# Patient Record
Sex: FEMALE | URBAN - METROPOLITAN AREA
[De-identification: names, ages, dates, MRNs, and addresses within clinical notes are randomized per-mention and may not be internally consistent; named-entity substitution may affect disease eponyms.]

---

## 2022-03-18 PROBLEM — I24.9 ACS (ACUTE CORONARY SYNDROME) (HCC): Status: ACTIVE | Noted: 2019-08-23

## 2022-03-18 PROBLEM — I21.9 MYOCARDIAL INFARCTION (HCC): Status: ACTIVE | Noted: 2019-08-23

## 2022-03-20 PROBLEM — G40.909 SEIZURE DISORDER (HCC): Status: ACTIVE | Noted: 2018-02-24

## 2022-03-20 PROBLEM — E66.01 SEVERE OBESITY (HCC): Status: ACTIVE | Noted: 2019-08-23

## 2023-09-13 ENCOUNTER — HOSPITAL ENCOUNTER (EMERGENCY)
Age: 53
Discharge: HOME | End: 2023-09-13
Attending: STUDENT IN AN ORGANIZED HEALTH CARE EDUCATION/TRAINING PROGRAM
Payer: MEDICARE

## 2023-09-13 VITALS
SYSTOLIC BLOOD PRESSURE: 148 MMHG | HEIGHT: 72 IN | DIASTOLIC BLOOD PRESSURE: 94 MMHG | RESPIRATION RATE: 17 BRPM | HEART RATE: 82 BPM | TEMPERATURE: 98 F | BODY MASS INDEX: 33.46 KG/M2 | WEIGHT: 247 LBS | OXYGEN SATURATION: 99 %

## 2023-09-13 DIAGNOSIS — H81.10 BENIGN PAROXYSMAL POSITIONAL VERTIGO, UNSPECIFIED LATERALITY: Primary | ICD-10-CM

## 2023-09-13 LAB
ALBUMIN SERPL-MCNC: 3.4 G/DL (ref 3.5–5)
ALBUMIN/GLOB SERPL: 0.9 (ref 0.4–1.6)
ALP SERPL-CCNC: 64 U/L (ref 50–136)
ALT SERPL-CCNC: 19 U/L (ref 12–65)
ANION GAP SERPL CALC-SCNC: 6 MMOL/L (ref 2–11)
AST SERPL-CCNC: 15 U/L (ref 15–37)
BASOPHILS # BLD: 0 K/UL (ref 0–0.2)
BASOPHILS NFR BLD: 0 % (ref 0–2)
BILIRUB SERPL-MCNC: 0.4 MG/DL (ref 0.2–1.1)
BUN SERPL-MCNC: 10 MG/DL (ref 6–23)
CALCIUM SERPL-MCNC: 9 MG/DL (ref 8.3–10.4)
CHLORIDE SERPL-SCNC: 106 MMOL/L (ref 101–110)
CO2 SERPL-SCNC: 27 MMOL/L (ref 21–32)
CREAT SERPL-MCNC: 0.8 MG/DL (ref 0.6–1)
DIFFERENTIAL METHOD BLD: ABNORMAL
EOSINOPHIL # BLD: 0 K/UL (ref 0–0.8)
EOSINOPHIL NFR BLD: 0 % (ref 0.5–7.8)
ERYTHROCYTE [DISTWIDTH] IN BLOOD BY AUTOMATED COUNT: 13.2 % (ref 11.9–14.6)
GLOBULIN SER CALC-MCNC: 4 G/DL (ref 2.8–4.5)
GLUCOSE SERPL-MCNC: 96 MG/DL (ref 65–100)
HCT VFR BLD AUTO: 36 % (ref 35.8–46.3)
HGB BLD-MCNC: 11.9 G/DL (ref 11.7–15.4)
IMM GRANULOCYTES # BLD AUTO: 0 K/UL (ref 0–0.5)
IMM GRANULOCYTES NFR BLD AUTO: 0 % (ref 0–5)
LYMPHOCYTES # BLD: 1.2 K/UL (ref 0.5–4.6)
LYMPHOCYTES NFR BLD: 16 % (ref 13–44)
MCH RBC QN AUTO: 29.9 PG (ref 26.1–32.9)
MCHC RBC AUTO-ENTMCNC: 33.1 G/DL (ref 31.4–35)
MCV RBC AUTO: 90.5 FL (ref 82–102)
MONOCYTES # BLD: 0.5 K/UL (ref 0.1–1.3)
MONOCYTES NFR BLD: 7 % (ref 4–12)
NEUTS SEG # BLD: 5.7 K/UL (ref 1.7–8.2)
NEUTS SEG NFR BLD: 77 % (ref 43–78)
NRBC # BLD: 0 K/UL (ref 0–0.2)
PLATELET # BLD AUTO: 213 K/UL (ref 150–450)
PMV BLD AUTO: 11.3 FL (ref 9.4–12.3)
POTASSIUM SERPL-SCNC: 3.6 MMOL/L (ref 3.5–5.1)
PROT SERPL-MCNC: 7.4 G/DL (ref 6.3–8.2)
RBC # BLD AUTO: 3.98 M/UL (ref 4.05–5.2)
SODIUM SERPL-SCNC: 139 MMOL/L (ref 133–143)
WBC # BLD AUTO: 7.4 K/UL (ref 4.3–11.1)

## 2023-09-13 PROCEDURE — 80053 COMPREHEN METABOLIC PANEL: CPT

## 2023-09-13 PROCEDURE — 99284 EMERGENCY DEPT VISIT MOD MDM: CPT

## 2023-09-13 PROCEDURE — 85025 COMPLETE CBC W/AUTO DIFF WBC: CPT

## 2023-09-13 PROCEDURE — 6370000000 HC RX 637 (ALT 250 FOR IP): Performed by: STUDENT IN AN ORGANIZED HEALTH CARE EDUCATION/TRAINING PROGRAM

## 2023-09-13 RX ORDER — MECLIZINE HCL 25MG 25 MG/1
25 TABLET, CHEWABLE ORAL 3 TIMES DAILY PRN
Qty: 15 TABLET | Refills: 0 | Status: SHIPPED | OUTPATIENT
Start: 2023-09-13

## 2023-09-13 RX ORDER — MECLIZINE HYDROCHLORIDE 25 MG/1
25 TABLET ORAL
Status: COMPLETED | OUTPATIENT
Start: 2023-09-13 | End: 2023-09-13

## 2023-09-13 RX ADMIN — MECLIZINE HYDROCHLORIDE 25 MG: 25 TABLET ORAL at 19:47

## 2023-09-13 ASSESSMENT — LIFESTYLE VARIABLES: HOW OFTEN DO YOU HAVE A DRINK CONTAINING ALCOHOL: NEVER

## 2023-09-13 ASSESSMENT — PAIN SCALES - GENERAL: PAINLEVEL_OUTOF10: 4

## 2023-09-13 ASSESSMENT — PAIN - FUNCTIONAL ASSESSMENT: PAIN_FUNCTIONAL_ASSESSMENT: 0-10

## 2023-09-13 NOTE — ED TRIAGE NOTES
Pt c/o dizziness since 2pm today, states she feels like the room is spinning. Denies numbness, weakness, or tingling, also denies CP or SOB. Pt also reports a headache.

## 2023-09-14 NOTE — DISCHARGE INSTRUCTIONS
Take meclizine as needed for dizziness. Follow-up with your neurologist next week as previously scheduled. Return to the ER for any worsening or worrisome symptoms.

## 2023-09-14 NOTE — ED PROVIDER NOTES
Emergency Department Provider Note       PCP: Pcp No   Age: 50 y.o. Sex: female     DISPOSITION Decision To Discharge 09/13/2023 08:34:28 PM       ICD-10-CM    1. Benign paroxysmal positional vertigo, unspecified laterality  H81.10           Medical Decision Making     Complexity of Problems Addressed:  Complexity of Problem: 1 acute, uncomplicated illness or injury. Data Reviewed and Analyzed:  I independently ordered and reviewed each unique test.  I reviewed external records: provider visit note from outside specialist.   The patients assessment required an independent historian: mother. The reason they were needed is important historical information not provided by the patient. Discussion of management or test interpretation. 53-year-old female presents to the emergency department complaining of dizziness that began earlier today. States she was up and walking and moving her head when the symptoms began. Denies any headache. Denies numbness or weakness in extremities. Denies coordination abnormalities or difficulty walking. Does report similar symptoms in the past that resolved on their own. Is established with a neurologist.  Basic lab work was ordered which shows no emergent findings. Patient given meclizine. Does endorse improvement of symptoms after meclizine. On exam there is normal neurologic exam, patient is able to ambulate without difficulty. She is in no distress and otherwise well-appearing. Risk of Complications and/or Morbidity of Patient Management:  Prescription drug management performed    History      53-year-old female presents to the emergency department complaining of dizziness that began earlier today. States she was up and walking and moving her head when the symptoms began. Denies any headache. Denies numbness or weakness in extremities. Denies coordination abnormalities or difficulty walking.   Does report similar symptoms in the past that resolved on

## 2023-10-21 ENCOUNTER — HOSPITAL ENCOUNTER (EMERGENCY)
Age: 53
Discharge: HOME | End: 2023-10-21
Attending: EMERGENCY MEDICINE
Payer: MEDICARE

## 2023-10-21 VITALS
HEIGHT: 72 IN | WEIGHT: 252 LBS | RESPIRATION RATE: 20 BRPM | SYSTOLIC BLOOD PRESSURE: 125 MMHG | HEART RATE: 77 BPM | TEMPERATURE: 98.4 F | OXYGEN SATURATION: 98 % | BODY MASS INDEX: 34.13 KG/M2 | DIASTOLIC BLOOD PRESSURE: 86 MMHG

## 2023-10-21 DIAGNOSIS — T78.40XA ALLERGIC REACTION, INITIAL ENCOUNTER: Primary | ICD-10-CM

## 2023-10-21 LAB
ALBUMIN SERPL-MCNC: 3.8 G/DL (ref 3.5–5)
ALBUMIN/GLOB SERPL: 0.9 (ref 0.4–1.6)
ALP SERPL-CCNC: 73 U/L (ref 50–136)
ALT SERPL-CCNC: 17 U/L (ref 12–65)
ANION GAP SERPL CALC-SCNC: 6 MMOL/L (ref 2–11)
AST SERPL-CCNC: 17 U/L (ref 15–37)
BASOPHILS # BLD: 0 K/UL (ref 0–0.2)
BASOPHILS NFR BLD: 0 % (ref 0–2)
BILIRUB SERPL-MCNC: 0.3 MG/DL (ref 0.2–1.1)
BUN SERPL-MCNC: 11 MG/DL (ref 6–23)
CALCIUM SERPL-MCNC: 9.1 MG/DL (ref 8.3–10.4)
CHLORIDE SERPL-SCNC: 107 MMOL/L (ref 101–110)
CO2 SERPL-SCNC: 25 MMOL/L (ref 21–32)
CREAT SERPL-MCNC: 1.05 MG/DL (ref 0.6–1)
DIFFERENTIAL METHOD BLD: ABNORMAL
EOSINOPHIL # BLD: 0 K/UL (ref 0–0.8)
EOSINOPHIL NFR BLD: 0 % (ref 0.5–7.8)
ERYTHROCYTE [DISTWIDTH] IN BLOOD BY AUTOMATED COUNT: 13.3 % (ref 11.9–14.6)
GLOBULIN SER CALC-MCNC: 4.4 G/DL (ref 2.8–4.5)
GLUCOSE SERPL-MCNC: 107 MG/DL (ref 65–100)
HCT VFR BLD AUTO: 41.5 % (ref 35.8–46.3)
HGB BLD-MCNC: 13.7 G/DL (ref 11.7–15.4)
IMM GRANULOCYTES # BLD AUTO: 0.1 K/UL (ref 0–0.5)
IMM GRANULOCYTES NFR BLD AUTO: 0 % (ref 0–5)
LYMPHOCYTES # BLD: 2.6 K/UL (ref 0.5–4.6)
LYMPHOCYTES NFR BLD: 21 % (ref 13–44)
MCH RBC QN AUTO: 29.9 PG (ref 26.1–32.9)
MCHC RBC AUTO-ENTMCNC: 33 G/DL (ref 31.4–35)
MCV RBC AUTO: 90.6 FL (ref 82–102)
MONOCYTES # BLD: 0.9 K/UL (ref 0.1–1.3)
MONOCYTES NFR BLD: 7 % (ref 4–12)
NEUTS SEG # BLD: 9 K/UL (ref 1.7–8.2)
NEUTS SEG NFR BLD: 71 % (ref 43–78)
NRBC # BLD: 0 K/UL (ref 0–0.2)
PLATELET # BLD AUTO: 306 K/UL (ref 150–450)
PMV BLD AUTO: 11.2 FL (ref 9.4–12.3)
POTASSIUM SERPL-SCNC: 4 MMOL/L (ref 3.5–5.1)
PROT SERPL-MCNC: 8.2 G/DL (ref 6.3–8.2)
RBC # BLD AUTO: 4.58 M/UL (ref 4.05–5.2)
SODIUM SERPL-SCNC: 138 MMOL/L (ref 133–143)
WBC # BLD AUTO: 12.7 K/UL (ref 4.3–11.1)

## 2023-10-21 PROCEDURE — 85025 COMPLETE CBC W/AUTO DIFF WBC: CPT

## 2023-10-21 PROCEDURE — 2580000003 HC RX 258: Performed by: EMERGENCY MEDICINE

## 2023-10-21 PROCEDURE — 99284 EMERGENCY DEPT VISIT MOD MDM: CPT

## 2023-10-21 PROCEDURE — 80053 COMPREHEN METABOLIC PANEL: CPT

## 2023-10-21 PROCEDURE — A4216 STERILE WATER/SALINE, 10 ML: HCPCS | Performed by: EMERGENCY MEDICINE

## 2023-10-21 PROCEDURE — 2500000003 HC RX 250 WO HCPCS: Performed by: EMERGENCY MEDICINE

## 2023-10-21 PROCEDURE — 96374 THER/PROPH/DIAG INJ IV PUSH: CPT

## 2023-10-21 PROCEDURE — 6360000002 HC RX W HCPCS: Performed by: EMERGENCY MEDICINE

## 2023-10-21 PROCEDURE — 96375 TX/PRO/DX INJ NEW DRUG ADDON: CPT

## 2023-10-21 RX ORDER — PREDNISONE 20 MG/1
60 TABLET ORAL DAILY
Qty: 6 TABLET | Refills: 0 | Status: SHIPPED | OUTPATIENT
Start: 2023-10-21 | End: 2023-10-23

## 2023-10-21 RX ORDER — DIPHENHYDRAMINE HYDROCHLORIDE 50 MG/ML
50 INJECTION INTRAMUSCULAR; INTRAVENOUS
Status: COMPLETED | OUTPATIENT
Start: 2023-10-21 | End: 2023-10-21

## 2023-10-21 RX ORDER — DEXAMETHASONE SODIUM PHOSPHATE 10 MG/ML
10 INJECTION INTRAMUSCULAR; INTRAVENOUS ONCE
Status: COMPLETED | OUTPATIENT
Start: 2023-10-21 | End: 2023-10-21

## 2023-10-21 RX ADMIN — DEXAMETHASONE SODIUM PHOSPHATE 10 MG: 10 INJECTION INTRAMUSCULAR; INTRAVENOUS at 15:38

## 2023-10-21 RX ADMIN — DIPHENHYDRAMINE HYDROCHLORIDE 50 MG: 50 INJECTION INTRAMUSCULAR; INTRAVENOUS at 15:38

## 2023-10-21 RX ADMIN — FAMOTIDINE 20 MG: 10 INJECTION, SOLUTION INTRAVENOUS at 15:39

## 2023-10-21 ASSESSMENT — LIFESTYLE VARIABLES
HOW OFTEN DO YOU HAVE A DRINK CONTAINING ALCOHOL: NEVER
HOW OFTEN DO YOU HAVE A DRINK CONTAINING ALCOHOL: NEVER
HOW MANY STANDARD DRINKS CONTAINING ALCOHOL DO YOU HAVE ON A TYPICAL DAY: PATIENT DOES NOT DRINK

## 2023-10-21 ASSESSMENT — ENCOUNTER SYMPTOMS
RHINORRHEA: 0
CONSTIPATION: 0
COLOR CHANGE: 0
DIARRHEA: 0
SORE THROAT: 0
ABDOMINAL PAIN: 0
WHEEZING: 0
DIFFICULTY BREATHING: 0
ALLERGIC REACTION: 1
VOICE CHANGE: 0
NAUSEA: 0
BACK PAIN: 0
TROUBLE SWALLOWING: 0
FACIAL SWELLING: 1
SHORTNESS OF BREATH: 0
COUGH: 0
VOMITING: 0

## 2023-10-21 ASSESSMENT — PAIN - FUNCTIONAL ASSESSMENT
PAIN_FUNCTIONAL_ASSESSMENT: NONE - DENIES PAIN
PAIN_FUNCTIONAL_ASSESSMENT: NONE - DENIES PAIN

## 2023-10-21 NOTE — ED TRIAGE NOTES
Patient reports an ant bite to foot 30 mins ago and then noticed rash, itching and swelling all over body and on the face. Speech clear in triage, no swelling to tongue.

## 2023-10-21 NOTE — ED NOTES
Pt states she is not itching. Pt skin complexion looks less red.       Ana Figueroa RN  10/21/23 [id number]

## 2023-10-21 NOTE — ED PROVIDER NOTES
Emergency Department Provider Note       PCP: Reza Arrington DO   Age: 50 y.o. Sex: female     DISPOSITION Decision To Discharge 10/21/2023 06:20:31 PM       ICD-10-CM    1. Allergic reaction, initial encounter  T78.40XA           Medical Decision Making     Complexity of Problems Addressed:  1 or more acute illnesses that pose a threat to life or bodily function. Data Reviewed and Analyzed:   I independently ordered and reviewed each unique test.         I independently ordered and interpreted the           Discussion of management or test interpretation. Patient with allergic reaction likely to ambulate. Has had pain and swelling previously. Developed some diffuse urticaria today. No lip tongue or throat swelling. No difficulty breathing or swallowing. Symptoms improved after steroids and Benadryl here. Will discharge with PCP follow-up. Risk of Complications and/or Morbidity of Patient Management:  Prescription drug management performed. Patient was discharged risks and benefits of hospitalization were considered. Shared medical decision making was utilized in creating the patients health plan today. Is this patient to be included in the SEP-1 core measure due to severe sepsis or septic shock? No Exclusion criteria - the patient is NOT to be included for SEP-1 Core Measure due to: Infection is not suspected      History      Patient is allergic to ants. She had an ant bite on her foot around 1430. Shortly afterwards she developed some itching under her arms and then after that her face became red with slight swelling and itching. Denies any tongue or throat swelling. No difficulty breathing or swallowing. No shortness of breath or wheezing. Came here for evaluation. The history is provided by the patient. No  was used.    Allergic Reaction  Presenting symptoms: itching, rash and swelling    Presenting symptoms: no difficulty breathing, no difficulty nRBC 0.00 0.0 - 0.2 K/uL    Differential Type AUTOMATED      Neutrophils % 71 43 - 78 %    Lymphocytes % 21 13 - 44 %    Monocytes % 7 4.0 - 12.0 %    Eosinophils % 0 (L) 0.5 - 7.8 %    Basophils % 0 0.0 - 2.0 %    Immature Granulocytes 0 0.0 - 5.0 %    Neutrophils Absolute 9.0 (H) 1.7 - 8.2 K/UL    Lymphocytes Absolute 2.6 0.5 - 4.6 K/UL    Monocytes Absolute 0.9 0.1 - 1.3 K/UL    Eosinophils Absolute 0.0 0.0 - 0.8 K/UL    Basophils Absolute 0.0 0.0 - 0.2 K/UL    Absolute Immature Granulocyte 0.1 0.0 - 0.5 K/UL   CMP   Result Value Ref Range    Sodium 138 133 - 143 mmol/L    Potassium 4.0 3.5 - 5.1 mmol/L    Chloride 107 101 - 110 mmol/L    CO2 25 21 - 32 mmol/L    Anion Gap 6 2 - 11 mmol/L    Glucose 107 (H) 65 - 100 mg/dL    BUN 11 6 - 23 MG/DL    Creatinine 1.05 (H) 0.6 - 1.0 MG/DL    Est, Glom Filt Rate >60 >60 ml/min/1.73m2    Calcium 9.1 8.3 - 10.4 MG/DL    Total Bilirubin 0.3 0.2 - 1.1 MG/DL    ALT 17 12 - 65 U/L    AST 17 15 - 37 U/L    Alk Phosphatase 73 50 - 136 U/L    Total Protein 8.2 6.3 - 8.2 g/dL    Albumin 3.8 3.5 - 5.0 g/dL    Globulin 4.4 2.8 - 4.5 g/dL    Albumin/Globulin Ratio 0.9 0.4 - 1.6          No orders to display                     Voice dictation software was used during the making of this note. This software is not perfect and grammatical and other typographical errors may be present. This note has not been completely proofread for errors.      Vivien Roca MD  10/21/23 Diana Harrison

## 2023-12-11 ENCOUNTER — APPOINTMENT (OUTPATIENT)
Dept: GENERAL RADIOLOGY | Age: 53
End: 2023-12-11
Payer: MEDICARE

## 2023-12-11 ENCOUNTER — HOSPITAL ENCOUNTER (EMERGENCY)
Age: 53
Discharge: HOME | End: 2023-12-11
Attending: STUDENT IN AN ORGANIZED HEALTH CARE EDUCATION/TRAINING PROGRAM
Payer: MEDICARE

## 2023-12-11 VITALS
OXYGEN SATURATION: 99 % | DIASTOLIC BLOOD PRESSURE: 84 MMHG | SYSTOLIC BLOOD PRESSURE: 132 MMHG | HEIGHT: 72 IN | TEMPERATURE: 97.7 F | BODY MASS INDEX: 34.95 KG/M2 | HEART RATE: 67 BPM | RESPIRATION RATE: 18 BRPM | WEIGHT: 258 LBS

## 2023-12-11 DIAGNOSIS — S52.502A CLOSED FRACTURE OF DISTAL END OF LEFT RADIUS, UNSPECIFIED FRACTURE MORPHOLOGY, INITIAL ENCOUNTER: Primary | ICD-10-CM

## 2023-12-11 DIAGNOSIS — S42.402A CLOSED FRACTURE OF DISTAL END OF LEFT HUMERUS, UNSPECIFIED FRACTURE MORPHOLOGY, INITIAL ENCOUNTER: ICD-10-CM

## 2023-12-11 PROCEDURE — 73070 X-RAY EXAM OF ELBOW: CPT

## 2023-12-11 PROCEDURE — 73110 X-RAY EXAM OF WRIST: CPT

## 2023-12-11 PROCEDURE — 96374 THER/PROPH/DIAG INJ IV PUSH: CPT

## 2023-12-11 PROCEDURE — 25605 CLTX DST RDL FX/EPHYS SEP W/: CPT

## 2023-12-11 PROCEDURE — 99285 EMERGENCY DEPT VISIT HI MDM: CPT

## 2023-12-11 PROCEDURE — 73100 X-RAY EXAM OF WRIST: CPT

## 2023-12-11 PROCEDURE — 6360000002 HC RX W HCPCS: Performed by: STUDENT IN AN ORGANIZED HEALTH CARE EDUCATION/TRAINING PROGRAM

## 2023-12-11 PROCEDURE — 96375 TX/PRO/DX INJ NEW DRUG ADDON: CPT

## 2023-12-11 RX ORDER — PROPOFOL 10 MG/ML
1 INJECTION, EMULSION INTRAVENOUS ONCE
Status: DISCONTINUED | OUTPATIENT
Start: 2023-12-11 | End: 2023-12-11 | Stop reason: SDUPTHER

## 2023-12-11 RX ORDER — MORPHINE SULFATE 4 MG/ML
4 INJECTION INTRAVENOUS ONCE
Status: COMPLETED | OUTPATIENT
Start: 2023-12-11 | End: 2023-12-11

## 2023-12-11 RX ORDER — IBUPROFEN 800 MG/1
800 TABLET ORAL EVERY 6 HOURS PRN
Qty: 20 TABLET | Refills: 0 | Status: SHIPPED | OUTPATIENT
Start: 2023-12-11

## 2023-12-11 RX ORDER — ONDANSETRON 2 MG/ML
4 INJECTION INTRAMUSCULAR; INTRAVENOUS
Status: COMPLETED | OUTPATIENT
Start: 2023-12-11 | End: 2023-12-11

## 2023-12-11 RX ORDER — HYDROCODONE BITARTRATE AND ACETAMINOPHEN 7.5; 325 MG/1; MG/1
1 TABLET ORAL EVERY 6 HOURS PRN
Qty: 8 TABLET | Refills: 0 | Status: SHIPPED | OUTPATIENT
Start: 2023-12-11 | End: 2023-12-14

## 2023-12-11 RX ADMIN — PROPOFOL 50 MG: 10 INJECTION, EMULSION INTRAVENOUS at 11:02

## 2023-12-11 RX ADMIN — MORPHINE SULFATE 4 MG: 4 INJECTION, SOLUTION INTRAMUSCULAR; INTRAVENOUS at 09:05

## 2023-12-11 RX ADMIN — PROPOFOL 50 MG: 10 INJECTION, EMULSION INTRAVENOUS at 11:04

## 2023-12-11 RX ADMIN — ONDANSETRON 4 MG: 2 INJECTION INTRAMUSCULAR; INTRAVENOUS at 09:05

## 2023-12-11 RX ADMIN — HYDROMORPHONE HYDROCHLORIDE 1 MG: 1 INJECTION, SOLUTION INTRAMUSCULAR; INTRAVENOUS; SUBCUTANEOUS at 12:06

## 2023-12-11 ASSESSMENT — PAIN DESCRIPTION - LOCATION: LOCATION: WRIST

## 2023-12-11 ASSESSMENT — PAIN DESCRIPTION - ORIENTATION: ORIENTATION: LEFT

## 2023-12-11 ASSESSMENT — PAIN SCALES - GENERAL
PAINLEVEL_OUTOF10: 8
PAINLEVEL_OUTOF10: 8

## 2023-12-11 ASSESSMENT — PAIN - FUNCTIONAL ASSESSMENT: PAIN_FUNCTIONAL_ASSESSMENT: 0-10

## 2023-12-11 NOTE — DISCHARGE INSTRUCTIONS
Leave the splint in place as directed until follow-up with the orthopedic specialist.  Use the medication for pain as needed. Apply ice to the area to help with pain and swelling. Return for worsening symptoms, concerns or questions. Sedation for a Medical Procedure: Care Instructions    You were given a sedative medication during your ED visit. While many of the effects will have worn   off before you leave; you may continue to feel some effects for several hours. Common side effects from sedation include:  Feeling sleepy. (Your doctors and nurses will make sure you are not too sleepy to go home.)  Nausea and vomiting. This usually does not last long. Feeling tired. How can you care for yourself at home? Activity    Don't do anything for 24 hours that requires attention to detail. It takes time for the medicine effects to completely wear off. Do not make important legal decisions for 24 hours. Do not sign any legal documents for 24 hours. Do not drink alcohol today     For your safety, you should not drive or operate heavy machinery for the remainder of the day     Rest when you feel tired. Getting enough sleep will help you recover. Diet    You can eat your normal diet, unless your doctor gives you other instructions. If your stomach is upset, try clear liquids and bland, low-fat foods like plain toast or rice. Drink plenty of fluids (unless your doctor tells you not to). Don't drink alcohol for 24 hours. Medicines    Be safe with medicines. Read and follow all instructions on the label. If the doctor gave you a prescription medicine for pain, take it as prescribed. If you are not taking a prescription pain medicine, ask your doctor if you can take an over-the-counter medicine. If you think your pain medicine is making you sick to your stomach: Take your medicine after meals (unless your doctor has told you not to). Ask your doctor for a different pain medicine.

## 2023-12-11 NOTE — ED TRIAGE NOTES
Pt arrives via EMS. Ems reports pt slipped and fell. Pt has obvious left wrist deformity. Wrist splinted with cardboard. EMS reports 100mc of fentanyl en route. No LOC, no thinners. A&Ox4.

## 2023-12-11 NOTE — ED PROVIDER NOTES
Emergency Department Provider Note       PCP: Mary Camp DO (Inactive)   Age: 48 y.o. Sex: female     DISPOSITION Decision To Discharge 12/11/2023 11:57:46 AM       ICD-10-CM    1. Closed fracture of distal end of left radius, unspecified fracture morphology, initial encounter  S52.502A HYDROcodone-acetaminophen (NORCO) 7.5-325 MG per tablet     Anam Issa MD, [facility]       2. Closed fracture of distal end of left humerus, unspecified fracture morphology, initial encounter  S42.402A HYDROcodone-acetaminophen (NORCO) 7.5-325 MG per tablet     Anam Issa MD, [facility] Dr Fabian Fitzgeralduts of Problems Addressed:  1 or more acute illnesses that pose a threat to life or bodily function. Data Reviewed and Analyzed:  I independently ordered and reviewed each unique test.     The patients assessment required an independent historian: EMS providers at bedside. The reason they were needed is important historical information not provided by the patient. I interpreted the X-rays distal radial fracture. Discussion of management or test interpretation. 82-year-old female presenting via EMS with reports of left wrist pain after a slip and fall on an icy surface just prior to arrival.  Will obtain x-ray imaging of both the wrist and elbow. Orders placed for morphine and Zofran IV. Perform sedation at bedside with propofol. Patient tolerated procedure well. Much improvement in anatomic alignment of the fracture. Patient placed in a long-arm posterior fracture given findings of distal radial fracture and distal humerus fracture. Will place in a sling and facilitate follow-up with hand specialist.  Patient voiced understanding agreement with this plan of care. The management of this patient was discussed with an external consultant.       Risk of Complications and/or Morbidity of Patient

## 2023-12-11 NOTE — ED NOTES
Pt informed about moderate sedation procedure by Dusty LYNN. Consent signed, dated, and timed by all parties.       Kati Lacey RN  12/11/23 [id number]

## 2023-12-12 ENCOUNTER — OFFICE VISIT (OUTPATIENT)
Dept: ORTHOPEDIC SURGERY | Age: 53
End: 2023-12-12
Payer: MEDICARE

## 2023-12-12 DIAGNOSIS — S52.532A CLOSED COLLES' FRACTURE OF LEFT RADIUS, INITIAL ENCOUNTER: Primary | ICD-10-CM

## 2023-12-12 DIAGNOSIS — S42.492A CLOSED BICONDYLAR FRACTURE OF DISTAL HUMERUS, LEFT, INITIAL ENCOUNTER: ICD-10-CM

## 2023-12-12 PROCEDURE — 99204 OFFICE O/P NEW MOD 45 MIN: CPT | Performed by: ORTHOPAEDIC SURGERY

## 2023-12-12 RX ORDER — EPINEPHRINE 0.3 MG/.3ML
INJECTION SUBCUTANEOUS
COMMUNITY
Start: 2023-10-26

## 2023-12-12 NOTE — PROGRESS NOTES
Orthopaedic Hand Clinic Note    Name: Patrizia Masters  YOB: 1970  Gender: female  MRN: [id number]      CC: Patient referred for evaluation of hand pain    HPI: Patrizia Masters is a 48 y.o. female with a chief complaint of left wrist and elbow pain. The injury occurred 1 day ago when the patient slipped and fell on icy ground. The pain is located diffusely about the wrist and elbow. Denies numbness or paresthesias of the fingers. Evaluation has included x-rays. Treatment to date has included splint. Denies loss of consciousness, head injury or neck pain. She has a past medical history of seizure disorder, brain surgery, and MI in 2018      ROS/Meds/PSH/PMH/FH/SH: I personally reviewed the patients standard intake form. Pertinents are discussed in the HPI    Physical Examination  Musculoskeletal Exam:  Examination of the left upper extremity demonstrates splint in place to the left upper extremity. There is tenderness to palpation at the wrist and elbow. Light touch sensation is intact in all digits    Imaging / Electrodiagnostic Tests:     I independently reviewed and interpreted radiographs of the left wrist.  They demonstrate dorsally displaced distal radius fracture, which does appear to be intraarticular. There is a small ulnar styloid fracture  I independently reviewed and interpreted radiographs of the left elbow. They demonstrate a comminuted and displaced intra-articular distal humerus fracture    Assessment:     ICD-10-CM    1. Closed Colles' fracture of left radius, initial encounter  S52.532A       2. Closed bicondylar fracture of distal humerus, left, initial encounter  S42.492A           Plan:   We discussed the diagnosis and different treatment options. We discussed observation, casting, further imaging, and surgical fixation and the risks, benefits and alternatives of all these options.   I discussed that both the distal radius and distal humerus fracture would require surgical

## 2024-01-02 ENCOUNTER — HOSPITAL ENCOUNTER (EMERGENCY)
Age: 54
Discharge: HOME | End: 2024-01-02
Payer: MEDICARE

## 2024-01-02 ENCOUNTER — APPOINTMENT (OUTPATIENT)
Dept: ULTRASOUND IMAGING | Age: 54
End: 2024-01-02
Payer: MEDICARE

## 2024-01-02 ENCOUNTER — APPOINTMENT (OUTPATIENT)
Dept: GENERAL RADIOLOGY | Age: 54
End: 2024-01-02
Payer: MEDICARE

## 2024-01-02 VITALS
SYSTOLIC BLOOD PRESSURE: 113 MMHG | RESPIRATION RATE: 18 BRPM | HEIGHT: 72 IN | WEIGHT: 250 LBS | BODY MASS INDEX: 33.86 KG/M2 | TEMPERATURE: 98.5 F | OXYGEN SATURATION: 97 % | DIASTOLIC BLOOD PRESSURE: 74 MMHG | HEART RATE: 87 BPM

## 2024-01-02 DIAGNOSIS — I82.4Z2 ACUTE DEEP VEIN THROMBOSIS (DVT) OF DISTAL VEIN OF LEFT LOWER EXTREMITY (HCC): Primary | ICD-10-CM

## 2024-01-02 PROCEDURE — 93971 EXTREMITY STUDY: CPT

## 2024-01-02 PROCEDURE — 73562 X-RAY EXAM OF KNEE 3: CPT

## 2024-01-02 PROCEDURE — 99284 EMERGENCY DEPT VISIT MOD MDM: CPT

## 2024-01-02 ASSESSMENT — PAIN SCALES - GENERAL: PAINLEVEL_OUTOF10: 2

## 2024-01-02 ASSESSMENT — PAIN - FUNCTIONAL ASSESSMENT: PAIN_FUNCTIONAL_ASSESSMENT: 0-10

## 2024-01-02 NOTE — ED NOTES
I have reviewed discharge instructions with the patient.  The patient verbalized understanding.    Patient left ED via Discharge Method: ambulatory to Home with (insert name of family/friend, self, transport via pov).    Opportunity for questions and clarification provided.       Patient given 1 scripts.         To continue your aftercare when you leave the hospital, you may receive an automated call from our care team to check in on how you are doing.  This is a free service and part of our promise to provide the best care and service to meet your aftercare needs.” If you have questions, or wish to unsubscribe from this service please call [phone].  Thank you for Choosing our [facility] Emergency Department.

## 2024-01-02 NOTE — ED PROVIDER NOTES
Emergency Department Provider Note       PCP: Lombardi, Milena, DO (Inactive)   Age: 53 y.o.   Sex: female     DISPOSITION       No diagnosis found.    Medical Decision Making     Complexity of Problems Addressed:  1 or more acute illnesses that pose a threat to life or bodily function.     Data Reviewed and Analyzed:  I independently ordered and reviewed each unique test.  I reviewed external records: provider visit note from PCP.  I reviewed external records: provider visit note from outside specialist.       I interpreted the X-rays knee x rays with djd.  I interpreted the Ultrasound  + dvt .    Discussion of management or test interpretation.  53-year-old female with DVT to the left lower extremity if this could have been present from the time of her fall 2 to 3 weeks ago and during her postop recovery.  Patient has no chest pain or shortness of breath will start on Xarelto and strict follow-up with primary care return to ED precautions given patient was discussed with Dr. De La Cruz      Risk of Complications and/or Morbidity of Patient Management:  Prescription drug management performed.         History       Pt with left knee pain for several months, pt has current left upper extremity fracture fx secondary to fall, on 12-11--23, she was seen by Dr. Saavedra but had surgery  in [place], [place], a few days later still has not seen an ortho for her knee, c/o increased swelling for past 2 weeks, no cp or sob    Past Medical History:  No date: Epilepsy (HCC)  2018: Heart attack (HCC)  No date: Seizures (HCC)     Past Surgical History:  2013: OTHER SURGICAL HISTORY      Comment:  Brain - for seizures (Epilepsy)            Review of Systems   All other systems reviewed and are negative.      Physical Exam     Vitals signs and nursing note reviewed:  Vitals:    01/02/24 1131   BP: 111/83   Pulse: 89   Resp: 17   Temp: 98.5 °F (36.9 °C)   TempSrc: Oral   SpO2: 97%   Weight: 113.4 kg (250 lb)   Height: 1.829 m (6')

## 2024-01-02 NOTE — ED TRIAGE NOTES
Pt ambulatory with c/o left knee pain and lower left leg swelling. Pt reports recent fall on her left side and left knee, but states she was having knee pain prior to the fall.

## 2024-01-02 NOTE — DISCHARGE INSTRUCTIONS
Use meds as dired stop asprin call your primary md office today to arrange follow up appt, return to er fo any chest pain or short of breath

## 2024-01-15 ENCOUNTER — EVALUATION (OUTPATIENT)
Age: 54
End: 2024-01-15
Payer: MEDICARE

## 2024-01-15 DIAGNOSIS — M25.642 STIFFNESS OF LEFT HAND JOINT: Primary | ICD-10-CM

## 2024-01-15 DIAGNOSIS — M79.89 SWELLING OF FINGER, LEFT: ICD-10-CM

## 2024-01-15 DIAGNOSIS — M25.532 WRIST PAIN, LEFT: ICD-10-CM

## 2024-01-15 DIAGNOSIS — M25.522 ELBOW PAIN, LEFT: ICD-10-CM

## 2024-01-15 PROCEDURE — 97110 THERAPEUTIC EXERCISES: CPT | Performed by: OCCUPATIONAL THERAPIST

## 2024-01-15 PROCEDURE — 97165 OT EVAL LOW COMPLEX 30 MIN: CPT | Performed by: OCCUPATIONAL THERAPIST

## 2024-01-15 NOTE — PROGRESS NOTES
[facility]  [address] [place] [id number]  Dept: [phone]      Occupational Therapy Initial Assessment     Referring MD: Bobo Landaverde MD    Diagnosis:     ICD-10-CM    1. Stiffness of left hand joint  M25.642       2. Elbow pain, left  M25.522       3. Swelling of finger, left  M79.89       4. Wrist pain, left  M25.532            Surgery: Left distal radius fracture ORIF and left distal radius fracture ORIF  Date 12/19/23     Therapy precautions: Fracture precautions    History of injury/onset:  PPt sustained a fall on ice on 12/11/23 in which she sustained a distal humerus and distal radius fracture t has most of her medical care at [facility], which she elected to have surgery at [facility] for this at the time. Pt sustained a fall on ice on 12/11/23 in which she sustained a distal humerus and distal radius fracture     l Direct Treatment Time: 15 min                       Total In Office Time: 45 min  Modifier needed: No  Episode visit count:  1     Preferred Name:  Maryanne     PERTINENT MEDICAL HISTORY     PMHX & Meds:   Past Medical History:   Diagnosis Date    Epilepsy (HCC)     Heart attack (HCC) 2018    Seizures (HCC)    ,   Past Surgical History:   Procedure Laterality Date    OTHER SURGICAL HISTORY  2013    Brain - for seizures (Epilepsy)      Medications. : Reviewed in chart  Allergies:   Allergies   Allergen Reactions    Tetracycline Anaphylaxis     Patient said when she was little she stopped breathing after taking     Tetracycline Anaphylaxis        SUBJECTIVE       Chief complaint/history of injury:   Date symptoms began: 12/11/23  Nature of condition:Recent onset (initial onset within last 3 months)  Primary cause of current episode: Traumatic  How did symptoms start: Pt was walking out of the house for work and she slipped and fell on ice; fire dept/EMS came; Pt had her post-op dressing removed on 1/11/24 by surgeon at [facility]; pt will go back to her surgeon on

## 2024-01-15 NOTE — PROGRESS NOTES
[facility] ORTHOPAED[facility]  [address] [place] [id number]  Dept: [phone]      Occupational Therapy Daily Note     Referring MD: Bobo Landaverde MD    Diagnosis:     ICD-10-CM    1. Stiffness of left hand joint  M25.642       2. Elbow pain, left  M25.522       3. Swelling of finger, left  M79.89       4. Wrist pain, left  M25.532            Surgery: Left distal radius fracture ORIF and left distal humerus  fracture ORIF  Date 12/19/23     Therapy precautions: Fracture precautions    History of injury/onset:  PPt sustained a fall on ice on 12/11/23 in which she sustained a distal humerus and distal radius fracture t has most of her medical care at [facility], which she elected to have surgery at [facility] for this at the time. Pt sustained a fall on ice on 12/11/23 in which she sustained a distal humerus and distal radius fracture     l Direct Treatment Time: 60 min                       Total In Office Time: 70 min  Modifier needed: No  Episode visit count:  2     Preferred Name:  Maryanne     PERTINENT MEDICAL HISTORY     PMHX & Meds:   Past Medical History:   Diagnosis Date    Epilepsy (HCC)     Heart attack (HCC) 2018    Seizures (HCC)    ,   Past Surgical History:   Procedure Laterality Date    OTHER SURGICAL HISTORY  2013    Brain - for seizures (Epilepsy)      Medications. : Reviewed in chart  Allergies:   Allergies   Allergen Reactions    Tetracycline Anaphylaxis     Patient said when she was little she stopped breathing after taking     Tetracycline Anaphylaxis        SUBJECTIVE       Chief complaint/history of injury:   Date symptoms began: 12/11/23  Nature of condition:Recent onset (initial onset within last 3 months)  Primary cause of current episode: Traumatic  How did symptoms start: Pt was walking out of the house for work and she slipped and fell on ice; fire dept/EMS came; Pt had her post-op dressing removed on 1/11/24 by surgeon at [facility]; pt will go back to her surgeon on 2/1/24.

## 2024-01-16 ENCOUNTER — TREATMENT (OUTPATIENT)
Age: 54
End: 2024-01-16
Payer: MEDICARE

## 2024-01-16 DIAGNOSIS — M25.642 STIFFNESS OF LEFT HAND JOINT: Primary | ICD-10-CM

## 2024-01-16 DIAGNOSIS — M79.89 SWELLING OF FINGER, LEFT: ICD-10-CM

## 2024-01-16 DIAGNOSIS — M25.532 WRIST PAIN, LEFT: ICD-10-CM

## 2024-01-16 DIAGNOSIS — M25.522 ELBOW PAIN, LEFT: ICD-10-CM

## 2024-01-16 PROCEDURE — 97110 THERAPEUTIC EXERCISES: CPT | Performed by: OCCUPATIONAL THERAPIST

## 2024-01-16 PROCEDURE — 97140 MANUAL THERAPY 1/> REGIONS: CPT | Performed by: OCCUPATIONAL THERAPIST

## 2024-01-18 ENCOUNTER — TREATMENT (OUTPATIENT)
Age: 54
End: 2024-01-18

## 2024-01-18 DIAGNOSIS — M25.522 ELBOW PAIN, LEFT: ICD-10-CM

## 2024-01-18 DIAGNOSIS — M25.532 WRIST PAIN, LEFT: ICD-10-CM

## 2024-01-18 DIAGNOSIS — M25.642 STIFFNESS OF LEFT HAND JOINT: Primary | ICD-10-CM

## 2024-01-18 NOTE — PROGRESS NOTES
[facility] ORTHOPAED[facility]  [address] [place] [id number]  Dept: [phone]      Occupational Therapy Daily Note     Referring MD: Bobo Landaverde MD    Diagnosis:     ICD-10-CM    1. Stiffness of left hand joint  M25.642       2. Elbow pain, left  M25.522       3. Wrist pain, left  M25.532              Surgery: Left distal radius fracture ORIF and left distal humerus  fracture ORIF  Date 12/19/23     Therapy precautions: Fracture precautions  EPILEPSY    History of injury/onset:  PPt sustained a fall on ice on 12/11/23 in which she sustained a distal humerus and distal radius fracture t has most of her medical care at [facility], which she elected to have surgery at [facility] for this at the time. Pt sustained a fall on ice on 12/11/23 in which she sustained a distal humerus and distal radius fracture     l Direct Treatment Time: 55 min                       Total In Office Time: 60 min  Modifier needed: No  Episode visit count:  3     Preferred Name:  Maryanne       SUBJECTIVE       Pt reports that she is less fearful of movement in the UE, still apprehensive of injury.    OBJECTIVE     A/PROM Measures:  A/PROM: AROM : IE  Involved Side    Shoulder screen Mildly limited/tight       Elbow extension/flexion WNL°  -45/94°      Supination/Pronation 76/85°  15/52°        Wrist A/PROM: RIGHT  IE LEFT  IE    Wrist Extension/Flexion 70/75°  18/16°     RD/UD 25/34°  5/5°                                                                    R                 L  Opposition (Katie): 10 1       Digit ext/flexion screen  8cm to DPC (all digits)      Treatment:     Therapeutic exercise (49625) x 45 min:  Extensive education on OT POC/rationale, explanation of interventions taken while performing  Moist hot pack applied to wrist and elbow, pt propped on mat   AAROM thumb, wrist ,fingers  Blocked MP for IP flexion  Extrinsic lengthening of finger flexors by therapist to tolerance  Progressive PROM composite

## 2024-01-22 ENCOUNTER — TREATMENT (OUTPATIENT)
Age: 54
End: 2024-01-22
Payer: MEDICARE

## 2024-01-22 ENCOUNTER — TELEPHONE (OUTPATIENT)
Age: 54
End: 2024-01-22

## 2024-01-22 DIAGNOSIS — M25.642 STIFFNESS OF LEFT HAND JOINT: Primary | ICD-10-CM

## 2024-01-22 DIAGNOSIS — M25.522 ELBOW PAIN, LEFT: ICD-10-CM

## 2024-01-22 DIAGNOSIS — M79.89 SWELLING OF FINGER, LEFT: ICD-10-CM

## 2024-01-22 DIAGNOSIS — M25.532 WRIST PAIN, LEFT: ICD-10-CM

## 2024-01-22 PROCEDURE — 97110 THERAPEUTIC EXERCISES: CPT | Performed by: OCCUPATIONAL THERAPIST

## 2024-01-22 PROCEDURE — 97140 MANUAL THERAPY 1/> REGIONS: CPT | Performed by: OCCUPATIONAL THERAPIST

## 2024-01-22 NOTE — TELEPHONE ENCOUNTER
Patient called in and states wrist red and swelling. Would like to contacted by clinical staff for insturctions

## 2024-01-22 NOTE — PROGRESS NOTES
Pt called OT office today regarding concern about new onset of swelling and redness on the ulnar aspect of the wrist. Reports of increasing pressure and stiffness around the fingers and wrist. Pt instructed to return to clinic this afternoon to allow therapist to inspect. No immediate concern regarding allergic reaction, etc. Pt instructed to continue with compressive sleeve for swelling and to ice as needed. She was given therapist email to direct any further concern or photos if needed of concern area. Pt is scheduled to be seen again on Wednesday 1/24. She will contact therapist in the morning if needing to return to clinic prior to that scheduled visit.   
applied to wrist and elbow, pt propped on mat   AAROM thumb, wrist ,fingers  Blocked MP for IP flexion  Grasp and sift beans for composite finger AROM   IHM sponges, x2 with attempts to bring into palm   Extrinsic lengthening of finger flexors by therapist to tolerance  Progressive PROM composite flexion all digits (to tolerance)   Elbow AAROM in supine/reclined   Gentle gravity assisted elbow flexion, shoulder flexed to 90 degrees, therapist supported            Manual Treatment: x 10 min:   Distraction and oscillations to the elbow with passive extension  Gentle retrograde edema massage in supine fingertips to elbow  Elbow slightly bent        Access Code: [email or web address]  URL: [email or web address]  Date: 01/15/2024  Prepared by: Love Gonzalez    Exercises  - Seated Shoulder Flexion Towel Slide at Table Top  - 3-4 x daily - 7 x weekly - 2 sets - 15 reps  - Seated Shoulder Abduction Towel Slide at Table Top  - 3-4 x daily - 7 x weekly - 2 sets - 15 reps  - Elbow AROM Flexion/Extension Forearm in Neutral in Supine  - 3-4 x daily - 7 x weekly - 2 sets - 15 reps  - Seated Forearm Pronation and Supination AROM  - 3-4 x daily - 7 x weekly - 2 sets - 15 reps  - Wrist AROM Flexion Extension  - 3-4 x daily - 7 x weekly - 2 sets - 15 reps  - Hand Towel Scrunching  - 3-4 x daily - 7 x weekly - 1 sets - 5-8 reps  - Wrist AAROM Flexion and Extension  - 3-4 x daily - 7 x weekly - 1 sets - 20 reps  - Forearm AAROM Supination and Pronation with Ball  - 3-4 x daily - 7 x weekly - 1 sets - 20 reps    ASSESSMENT      Improvements with finger AROM, PROM>AROM. Elbow stiffness with flexion, extension improving greatly. Wrist remains stiff but movement showing some slight improvements. Overall, improvements noted today throughout session.       PLAN OF CARE     Continue with intervention to decrease joint and soft tissue tightness, improve and encourage functional use of the L UE.   GOALS     Short term goals:  2/12/2024  (4

## 2024-01-22 NOTE — TELEPHONE ENCOUNTER
Spoke with patient regarding concern about inflammation on her wrist. She agreed to come in this afternoon to inspect.

## 2024-01-24 ENCOUNTER — TREATMENT (OUTPATIENT)
Age: 54
End: 2024-01-24
Payer: MEDICARE

## 2024-01-24 ENCOUNTER — HOSPITAL ENCOUNTER (EMERGENCY)
Age: 54
Discharge: HOME | End: 2024-01-25
Attending: EMERGENCY MEDICINE
Payer: MEDICARE

## 2024-01-24 ENCOUNTER — APPOINTMENT (OUTPATIENT)
Dept: ULTRASOUND IMAGING | Age: 54
End: 2024-01-24
Payer: MEDICARE

## 2024-01-24 DIAGNOSIS — M25.522 ELBOW PAIN, LEFT: ICD-10-CM

## 2024-01-24 DIAGNOSIS — N93.8 DUB (DYSFUNCTIONAL UTERINE BLEEDING): ICD-10-CM

## 2024-01-24 DIAGNOSIS — D64.9 ANEMIA, UNSPECIFIED TYPE: ICD-10-CM

## 2024-01-24 DIAGNOSIS — D25.9 UTERINE LEIOMYOMA, UNSPECIFIED LOCATION: ICD-10-CM

## 2024-01-24 DIAGNOSIS — M79.89 SWELLING OF FINGER, LEFT: ICD-10-CM

## 2024-01-24 DIAGNOSIS — M25.642 STIFFNESS OF LEFT HAND JOINT: Primary | ICD-10-CM

## 2024-01-24 DIAGNOSIS — M25.532 WRIST PAIN, LEFT: ICD-10-CM

## 2024-01-24 DIAGNOSIS — N30.00 ACUTE CYSTITIS WITHOUT HEMATURIA: Primary | ICD-10-CM

## 2024-01-24 LAB
ABO + RH BLD: NORMAL
ALBUMIN SERPL-MCNC: 3.5 G/DL (ref 3.5–5)
ALBUMIN/GLOB SERPL: 0.9 (ref 0.4–1.6)
ALP SERPL-CCNC: 73 U/L (ref 50–136)
ALT SERPL-CCNC: 14 U/L (ref 12–65)
ANION GAP SERPL CALC-SCNC: 3 MMOL/L (ref 2–11)
APPEARANCE UR: ABNORMAL
AST SERPL-CCNC: 10 U/L (ref 15–37)
BACTERIA URNS QL MICRO: ABNORMAL /HPF
BASOPHILS # BLD: 0 K/UL (ref 0–0.2)
BASOPHILS NFR BLD: 0 % (ref 0–2)
BILIRUB SERPL-MCNC: 0.2 MG/DL (ref 0.2–1.1)
BILIRUB UR QL: NEGATIVE
BLOOD GROUP ANTIBODIES SERPL: NORMAL
BUN SERPL-MCNC: 8 MG/DL (ref 6–23)
CALCIUM SERPL-MCNC: 8.7 MG/DL (ref 8.3–10.4)
CHLORIDE SERPL-SCNC: 109 MMOL/L (ref 103–113)
CO2 SERPL-SCNC: 25 MMOL/L (ref 21–32)
COLOR UR: ABNORMAL
CREAT SERPL-MCNC: 0.88 MG/DL (ref 0.6–1)
DIFFERENTIAL METHOD BLD: ABNORMAL
EKG ATRIAL RATE: 92 BPM
EKG DIAGNOSIS: NORMAL
EKG P AXIS: 66 DEGREES
EKG P-R INTERVAL: 130 MS
EKG Q-T INTERVAL: 336 MS
EKG QRS DURATION: 84 MS
EKG QTC CALCULATION (BAZETT): 416 MS
EKG R AXIS: 41 DEGREES
EKG T AXIS: 34 DEGREES
EKG VENTRICULAR RATE: 92 BPM
EOSINOPHIL # BLD: 0.2 K/UL (ref 0–0.8)
EOSINOPHIL NFR BLD: 2 % (ref 0.5–7.8)
EPI CELLS #/AREA URNS HPF: ABNORMAL /HPF
ERYTHROCYTE [DISTWIDTH] IN BLOOD BY AUTOMATED COUNT: 15.3 % (ref 11.9–14.6)
FERRITIN SERPL-MCNC: 5 NG/ML (ref 8–388)
GLOBULIN SER CALC-MCNC: 3.8 G/DL (ref 2.8–4.5)
GLUCOSE SERPL-MCNC: 112 MG/DL (ref 65–100)
GLUCOSE UR STRIP.AUTO-MCNC: NEGATIVE MG/DL
HCG UR QL: NEGATIVE
HCT VFR BLD AUTO: 30.4 % (ref 35.8–46.3)
HGB BLD-MCNC: 9.7 G/DL (ref 11.7–15.4)
HGB UR QL STRIP: ABNORMAL
IMM GRANULOCYTES # BLD AUTO: 0 K/UL (ref 0–0.5)
IMM GRANULOCYTES NFR BLD AUTO: 0 % (ref 0–5)
IRON SERPL-MCNC: 69 UG/DL (ref 35–150)
KETONES UR QL STRIP.AUTO: NEGATIVE MG/DL
LEUKOCYTE ESTERASE UR QL STRIP.AUTO: ABNORMAL
LYMPHOCYTES # BLD: 1.8 K/UL (ref 0.5–4.6)
LYMPHOCYTES NFR BLD: 18 % (ref 13–44)
MCH RBC QN AUTO: 29.6 PG (ref 26.1–32.9)
MCHC RBC AUTO-ENTMCNC: 31.9 G/DL (ref 31.4–35)
MCV RBC AUTO: 92.7 FL (ref 82–102)
MONOCYTES # BLD: 0.7 K/UL (ref 0.1–1.3)
MONOCYTES NFR BLD: 7 % (ref 4–12)
NEUTS SEG # BLD: 7.5 K/UL (ref 1.7–8.2)
NEUTS SEG NFR BLD: 73 % (ref 43–78)
NITRITE UR QL STRIP.AUTO: POSITIVE
NRBC # BLD: 0 K/UL (ref 0–0.2)
PH UR STRIP: 5 (ref 5–9)
PLATELET # BLD AUTO: 287 K/UL (ref 150–450)
PMV BLD AUTO: 10.2 FL (ref 9.4–12.3)
POTASSIUM SERPL-SCNC: 3.9 MMOL/L (ref 3.5–5.1)
PROT SERPL-MCNC: 7.3 G/DL (ref 6.3–8.2)
PROT UR STRIP-MCNC: 100 MG/DL
RBC # BLD AUTO: 3.28 M/UL (ref 4.05–5.2)
RBC #/AREA URNS HPF: >100 /HPF
SODIUM SERPL-SCNC: 137 MMOL/L (ref 136–146)
SP GR UR REFRACTOMETRY: 1.03 (ref 1–1.02)
SPECIMEN EXP DATE BLD: NORMAL
UROBILINOGEN UR QL STRIP.AUTO: 0.2 EU/DL (ref 0.2–1)
WBC # BLD AUTO: 10.2 K/UL (ref 4.3–11.1)
WBC URNS QL MICRO: ABNORMAL /HPF

## 2024-01-24 PROCEDURE — 99284 EMERGENCY DEPT VISIT MOD MDM: CPT

## 2024-01-24 PROCEDURE — 83540 ASSAY OF IRON: CPT

## 2024-01-24 PROCEDURE — 6360000002 HC RX W HCPCS: Performed by: EMERGENCY MEDICINE

## 2024-01-24 PROCEDURE — 82728 ASSAY OF FERRITIN: CPT

## 2024-01-24 PROCEDURE — 96374 THER/PROPH/DIAG INJ IV PUSH: CPT

## 2024-01-24 PROCEDURE — 81025 URINE PREGNANCY TEST: CPT

## 2024-01-24 PROCEDURE — 97110 THERAPEUTIC EXERCISES: CPT | Performed by: OCCUPATIONAL THERAPIST

## 2024-01-24 PROCEDURE — 85025 COMPLETE CBC W/AUTO DIFF WBC: CPT

## 2024-01-24 PROCEDURE — 87086 URINE CULTURE/COLONY COUNT: CPT

## 2024-01-24 PROCEDURE — 80053 COMPREHEN METABOLIC PANEL: CPT

## 2024-01-24 PROCEDURE — 86900 BLOOD TYPING SEROLOGIC ABO: CPT

## 2024-01-24 PROCEDURE — 93010 ELECTROCARDIOGRAM REPORT: CPT | Performed by: INTERNAL MEDICINE

## 2024-01-24 PROCEDURE — 93005 ELECTROCARDIOGRAM TRACING: CPT | Performed by: EMERGENCY MEDICINE

## 2024-01-24 PROCEDURE — 81001 URINALYSIS AUTO W/SCOPE: CPT

## 2024-01-24 PROCEDURE — 2580000003 HC RX 258: Performed by: EMERGENCY MEDICINE

## 2024-01-24 PROCEDURE — 86850 RBC ANTIBODY SCREEN: CPT

## 2024-01-24 PROCEDURE — 76830 TRANSVAGINAL US NON-OB: CPT

## 2024-01-24 PROCEDURE — 97140 MANUAL THERAPY 1/> REGIONS: CPT | Performed by: OCCUPATIONAL THERAPIST

## 2024-01-24 PROCEDURE — 86901 BLOOD TYPING SEROLOGIC RH(D): CPT

## 2024-01-24 RX ORDER — 0.9 % SODIUM CHLORIDE 0.9 %
1000 INTRAVENOUS SOLUTION INTRAVENOUS ONCE
Status: COMPLETED | OUTPATIENT
Start: 2024-01-24 | End: 2024-01-25

## 2024-01-24 RX ADMIN — WATER 1000 MG: 1 INJECTION INTRAMUSCULAR; INTRAVENOUS; SUBCUTANEOUS at 21:30

## 2024-01-24 RX ADMIN — SODIUM CHLORIDE 1000 ML: 9 INJECTION, SOLUTION INTRAVENOUS at 21:41

## 2024-01-24 ASSESSMENT — ENCOUNTER SYMPTOMS
ABDOMINAL PAIN: 0
NAUSEA: 0
VOMITING: 0
SHORTNESS OF BREATH: 0
COUGH: 0
DIARRHEA: 0

## 2024-01-24 ASSESSMENT — LIFESTYLE VARIABLES
HOW OFTEN DO YOU HAVE A DRINK CONTAINING ALCOHOL: NEVER
HOW MANY STANDARD DRINKS CONTAINING ALCOHOL DO YOU HAVE ON A TYPICAL DAY: PATIENT DOES NOT DRINK

## 2024-01-24 ASSESSMENT — PAIN - FUNCTIONAL ASSESSMENT: PAIN_FUNCTIONAL_ASSESSMENT: NONE - DENIES PAIN

## 2024-01-24 NOTE — PROGRESS NOTES
GVL OT INT [facility]TH[facility]  [address] [place] [id number]  Dept: [phone]      Occupational Therapy Daily Note     Referring MD: Bobo Landaverde MD    Diagnosis:     ICD-10-CM    1. Stiffness of left hand joint  M25.642       2. Elbow pain, left  M25.522       3. Wrist pain, left  M25.532       4. Swelling of finger, left  M79.89             Surgery: Left distal radius fracture ORIF and left distal humerus  fracture ORIF  Date 12/19/23     Therapy precautions: Fracture precautions  EPILEPSY    History of injury/onset:  PPt sustained a fall on ice on 12/11/23 in which she sustained a distal humerus and distal radius fracture t has most of her medical care at [facility], which she elected to have surgery at [facility] for this at the time. Pt sustained a fall on ice on 12/11/23 in which she sustained a distal humerus and distal radius fracture     l Direct Treatment Time:50 min                       Total In Office Time: 60 min  Modifier needed: No  Episode visit count:  5     Preferred Name:  Maryanne       SUBJECTIVE     Pt reports improvement in the redness and swelling that was isolated ulnar wrist on Monday. She still reports general swelling in the hand and is concerned whether or not this will affect her movement.     OBJECTIVE     A/PROM Measures:  A/PROM: AROM : IE  Involved Side    Shoulder screen Mildly limited/tight       Elbow extension/flexion WNL°  -45/94°      Supination/Pronation 76/85°  15/52°        Wrist A/PROM: RIGHT  IE LEFT  IE    Wrist Extension/Flexion 70/75°  18/16°     RD/UD 25/34°  5/5°                                                                    R                 L  Opposition (Katie): 10 1       Digit ext/flexion screen  8cm to DPC (all digits)      Treatment:     Therapeutic exercise (08343) x 30 min:  Extensive education on OT POC/rationale, explanation of interventions taken while performing (continued)   Moist hot pack applied to wrist and elbow, pt  show

## 2024-01-25 ENCOUNTER — TREATMENT (OUTPATIENT)
Age: 54
End: 2024-01-25

## 2024-01-25 ENCOUNTER — TELEPHONE (OUTPATIENT)
Dept: OBGYN CLINIC | Age: 54
End: 2024-01-25

## 2024-01-25 VITALS
SYSTOLIC BLOOD PRESSURE: 119 MMHG | BODY MASS INDEX: 21.54 KG/M2 | HEART RATE: 92 BPM | WEIGHT: 159 LBS | DIASTOLIC BLOOD PRESSURE: 83 MMHG | OXYGEN SATURATION: 99 % | HEIGHT: 72 IN | RESPIRATION RATE: 17 BRPM | TEMPERATURE: 99 F

## 2024-01-25 DIAGNOSIS — M79.89 SWELLING OF FINGER, LEFT: ICD-10-CM

## 2024-01-25 DIAGNOSIS — M25.522 ELBOW PAIN, LEFT: ICD-10-CM

## 2024-01-25 DIAGNOSIS — M25.642 STIFFNESS OF LEFT HAND JOINT: Primary | ICD-10-CM

## 2024-01-25 DIAGNOSIS — M25.532 WRIST PAIN, LEFT: ICD-10-CM

## 2024-01-25 RX ORDER — MEDROXYPROGESTERONE ACETATE 10 MG/1
10 TABLET ORAL DAILY
Qty: 14 TABLET | Refills: 0 | Status: SHIPPED | OUTPATIENT
Start: 2024-01-25 | End: 2024-02-08

## 2024-01-25 RX ORDER — CEPHALEXIN 500 MG/1
500 CAPSULE ORAL 2 TIMES DAILY
Qty: 20 CAPSULE | Refills: 0 | Status: SHIPPED | OUTPATIENT
Start: 2024-01-25 | End: 2024-02-04

## 2024-01-25 NOTE — DISCHARGE INSTRUCTIONS
Take medications as prescribed.  Schedule follow-up appoint with OB/GYN soon as possible for further evaluation.  Please return to ED if symptoms worsen or progress in any way.

## 2024-01-25 NOTE — ED NOTES
I have reviewed discharge instructions with the patient and family.  The patient and family verbalized understanding.    Patient left ED via Discharge Method: ambulatory to Home with family.    Opportunity for questions and clarification provided.       Patient given 2 scripts.         To continue your aftercare when you leave the hospital, you may receive an automated call from our care team to check in on how you are doing.  This is a free service and part of our promise to provide the best care and service to meet your aftercare needs.” If you have questions, or wish to unsubscribe from this service please call [phone].  Thank you for Choosing our [facility] Emergency Department.      No

## 2024-01-25 NOTE — ED TRIAGE NOTES
Pt arrives to the ED with complaints of heavy vaginal bleeding. Pt admits to passing large clots. Pt also admits that she had surgery on her hand last month and lost a lot of blood during the surgery and is now concerned about her hgb levels due to the increased vaginal bleeding. Pt is diaphoretic, short of breath and tachycardic at 115bpm upon triage.

## 2024-01-25 NOTE — ED PROVIDER NOTES
Substance and Sexual Activity    Alcohol use: Never    Drug use: Never   Social History Narrative    ** Merged History Encounter **             Previous Medications    AZELASTINE (ASTELIN) 0.1 % NASAL SPRAY    2 sprays by Nasal route 2 times daily    EPINEPHRINE (EPIPEN) 0.3 MG/0.3ML SOAJ INJECTION    INJECT 0.3MG (0.3ML) INTO THE MUSCLE AS DIRECTED AS NEEDED FOR ANAPHYLASIX AS NEEDED    FLUTICASONE (FLONASE) 50 MCG/ACT NASAL SPRAY    2 sprays by Nasal route daily    IBUPROFEN (ADVIL;MOTRIN) 800 MG TABLET    Take 1 tablet by mouth every 6 hours as needed for Pain    MECLIZINE (ANTIVERT) 25 MG CHEW    Take 1 tablet by mouth 3 times daily as needed (dizziness)    OMEPRAZOLE (PRILOSEC) 40 MG DELAYED RELEASE CAPSULE    Take 40 mg by mouth daily    RIVAROXABAN 15 & 20 MG STARTER PACK    Use as directed        Results for orders placed or performed during the hospital encounter of 01/24/24   US PELVIS COMPLETE NON-OB TRANSABDOMINAL AND TRANSVAGINAL    Narrative    US PELVIS COMPLETE NON-OB TRANSABDOMINAL AND TRANSVAGINAL    INDICATION:Heavy vaginal bleeding    COMPARISON: None.    TECHNIQUE: Transabdominal and transvaginal ultrasound of the pelvis was  performed.     FINDINGS: The uterus measures 17.3 x 11.5 cm and demonstrates multiple uterine  fibroids are noted. Largest in the left measures 6.5 x 5.7 x 7.1 cm. There is a  exophytic fibroid that measures 8.4 x 5.3 x 7.2 cm. Largest right fibroid  measures 5.5 x 6.1 x 4.8 cm.. Endometrial stripe measures    Bilateral ovaries are not visualized. There is no free fluid.        Impression    Enlarged uterus containing multiple uterine fibroids.                      CBC with Auto Differential   Result Value Ref Range    WBC 10.2 4.3 - 11.1 K/uL    RBC 3.28 (L) 4.05 - 5.2 M/uL    Hemoglobin 9.7 (L) 11.7 - 15.4 g/dL    Hematocrit 30.4 (L) 35.8 - 46.3 %    MCV 92.7 82.0 - 102.0 FL    MCH 29.6 26.1 - 32.9 PG    MCHC 31.9 31.4 - 35.0 g/dL    RDW 15.3 (H) 11.9 - 14.6 %

## 2024-01-25 NOTE — TELEPHONE ENCOUNTER
Pt called office stating that she was seen in the ED last night and given a referral to Dr Bailon but would like to see a female provider instead. Returned the patient's call and informed her that Adamaris does not practice at this office and gave her the contact information for his office.

## 2024-01-25 NOTE — PROGRESS NOTES
will have at least 40 psi of  strength affected  hand to improve ability to grasp and hold an object.  Pt will demonstrate WFL wrist AROM in order to perform all required daily activities.   Pt will be able to lift and carry items (ie grocery bags, laundry basket etc) with affected UE pain 2 of 10 or less.  Pt will be able to sleep through the night with no pain in affected UE.  Pts Quick DASH functional assessment score will be less than 20% functional deficit.     [facility] Portal     OT Protocols

## 2024-01-27 LAB
BACTERIA SPEC CULT: NORMAL
BACTERIA SPEC CULT: NORMAL
SERVICE CMNT-IMP: NORMAL

## 2024-01-30 ENCOUNTER — TREATMENT (OUTPATIENT)
Age: 54
End: 2024-01-30
Payer: MEDICARE

## 2024-01-30 DIAGNOSIS — M79.89 SWELLING OF FINGER, LEFT: ICD-10-CM

## 2024-01-30 DIAGNOSIS — M25.532 WRIST PAIN, LEFT: ICD-10-CM

## 2024-01-30 DIAGNOSIS — M25.642 STIFFNESS OF LEFT HAND JOINT: Primary | ICD-10-CM

## 2024-01-30 DIAGNOSIS — M25.522 ELBOW PAIN, LEFT: ICD-10-CM

## 2024-01-30 PROCEDURE — 97140 MANUAL THERAPY 1/> REGIONS: CPT | Performed by: OCCUPATIONAL THERAPIST

## 2024-01-30 PROCEDURE — 97110 THERAPEUTIC EXERCISES: CPT | Performed by: OCCUPATIONAL THERAPIST

## 2024-01-30 NOTE — PROGRESS NOTES
Moist hot pack applied to wrist and elbow, pt propped on mat   Coban wrap to digits left hand into full composite fist   Grasp and sift beans for composite finger AROM   Reach behind back to grasp sponges and place into bucket   Extrinsic lengthening of finger flexors by therapist to tolerance  Cont education on importance of doing exercises.   Progressive PROM composite flexion all digits (to tolerance)   Elbow AAROM in supine/reclined   Gentle gravity assisted elbow flexion, shoulder flexed to 90 degrees           Manual Treatment: x 25 min:   Distraction and oscillations to the elbow with passive extension  Gentle STM around volar scar area as well as with shoulder flexed to 90 degrees, focused around elbow scar   Gentle retrograde edema massage in supine fingertips to elbow  Elbow slightly bent   PROM all digits to tolerance, progress towards composite fist        Access Code: [email or web address]  URL: [email or web address]  Date: 01/15/2024  Prepared by: Love Gonzalez    Exercises  - Seated Shoulder Flexion Towel Slide at Table Top  - 3-4 x daily - 7 x weekly - 2 sets - 15 reps  - Seated Shoulder Abduction Towel Slide at Table Top  - 3-4 x daily - 7 x weekly - 2 sets - 15 reps  - Elbow AROM Flexion/Extension Forearm in Neutral in Supine  - 3-4 x daily - 7 x weekly - 2 sets - 15 reps  - Seated Forearm Pronation and Supination AROM  - 3-4 x daily - 7 x weekly - 2 sets - 15 reps  - Wrist AROM Flexion Extension  - 3-4 x daily - 7 x weekly - 2 sets - 15 reps  - Hand Towel Scrunching  - 3-4 x daily - 7 x weekly - 1 sets - 5-8 reps  - Wrist AAROM Flexion and Extension  - 3-4 x daily - 7 x weekly - 1 sets - 20 reps  - Forearm AAROM Supination and Pronation with Ball  - 3-4 x daily - 7 x weekly - 1 sets - 20 reps    ASSESSMENT      Pt remains swollen. Her finger PROM is improving but she has difficulty maintaining active fist. She still does not have a full composite active or passive fist. Continuous education on

## 2024-01-31 ENCOUNTER — TREATMENT (OUTPATIENT)
Age: 54
End: 2024-01-31

## 2024-01-31 DIAGNOSIS — M79.89 SWELLING OF FINGER, LEFT: ICD-10-CM

## 2024-01-31 DIAGNOSIS — M25.642 STIFFNESS OF LEFT HAND JOINT: Primary | ICD-10-CM

## 2024-01-31 DIAGNOSIS — M25.532 WRIST PAIN, LEFT: ICD-10-CM

## 2024-01-31 DIAGNOSIS — M25.522 ELBOW PAIN, LEFT: ICD-10-CM

## 2024-01-31 NOTE — PROGRESS NOTES
GVL OT INT [facility] ORTHOPAED[facility]  [address] [place] [id number]  Dept: [phone]      Occupational Therapy Daily Note     Referring MD: Bobo Landaverde MD    Diagnosis:     ICD-10-CM    1. Stiffness of left hand joint  M25.642       2. Elbow pain, left  M25.522       3. Wrist pain, left  M25.532       4. Swelling of finger, left  M79.89             Surgery: Left distal radius fracture ORIF and left distal humerus  fracture ORIF  Date 12/19/23     Therapy precautions: Fracture precautions  EPILEPSY    History of injury/onset:  PPt sustained a fall on ice on 12/11/23 in which she sustained a distal humerus and distal radius fracture t has most of her medical care at [facility], which she elected to have surgery at [facility] for this at the time. Pt sustained a fall on ice on 12/11/23 in which she sustained a distal humerus and distal radius fracture     l Direct Treatment Time:55 min                       Total In Office Time: 60 min  Modifier needed: No  Episode visit count:  8     Preferred Name:  Maryanne       SUBJECTIVE     Pt reports she still feels very consistently swollen which affects her ability to move her fingers.     OBJECTIVE     A/PROM Measures:  A/PROM: AROM : IE  Involved Side    Shoulder screen Mildly limited/tight                   Elbow extension/flexion WNL°  -45/94°      Supination/Pronation 76/85°  15/52°                                                                       R                 L  Opposition (Katie): 10 1       Digit ext/flexion screen  8cm to DPC (all digits)  4cm to DPC      Treatment:     Fluidotherapy (82822):  Therapist guided AROM in fluidotherapy for heat/ROM prior to exercise and manual intervention to increase available ROM and joint mobility    Therapeutic exercise (40044) x 45 min:  Extensive education on OT POC/rationale, explanation of interventions taken while performing (continued)   AAROM thumb, wrist ,fingers ([place] by therapist)   Grasp and sift

## 2024-02-02 ENCOUNTER — TREATMENT (OUTPATIENT)
Age: 54
End: 2024-02-02
Payer: MEDICARE

## 2024-02-02 ENCOUNTER — EVALUATION (OUTPATIENT)
Age: 54
End: 2024-02-02

## 2024-02-02 DIAGNOSIS — R53.1 WEAKNESS GENERALIZED: Primary | ICD-10-CM

## 2024-02-02 DIAGNOSIS — M25.562 CHRONIC PAIN OF LEFT KNEE: ICD-10-CM

## 2024-02-02 DIAGNOSIS — M25.662 STIFFNESS OF KNEE JOINT, LEFT: ICD-10-CM

## 2024-02-02 DIAGNOSIS — M79.89 SWELLING OF FINGER, LEFT: ICD-10-CM

## 2024-02-02 DIAGNOSIS — G89.29 CHRONIC PAIN OF LEFT KNEE: ICD-10-CM

## 2024-02-02 DIAGNOSIS — Z74.09 DECREASED FUNCTIONAL MOBILITY AND ENDURANCE: ICD-10-CM

## 2024-02-02 DIAGNOSIS — M25.642 STIFFNESS OF LEFT HAND JOINT: Primary | ICD-10-CM

## 2024-02-02 DIAGNOSIS — M25.532 WRIST PAIN, LEFT: ICD-10-CM

## 2024-02-02 DIAGNOSIS — M25.522 ELBOW PAIN, LEFT: ICD-10-CM

## 2024-02-02 PROCEDURE — 97110 THERAPEUTIC EXERCISES: CPT | Performed by: OCCUPATIONAL THERAPIST

## 2024-02-02 NOTE — PROGRESS NOTES
Grasp and sift beans for composite finger AROM   IHM sponges, x2 (improved since last visit)   Reach behind back to grasp sponges and place into bucket   Towel crawls x6 in/out   Extrinsic lengthening of finger flexors by therapist to tolerance  Cont education on importance of doing exercises.   Measurements taken   Progressive PROM composite flexion all digits (to tolerance)   Pt given finger flexion glove for use at home, education on timeframe and frequency of use                 Access Code: [email or web address]  URL: [email or web address]  Date: 01/15/2024  Prepared by: Love Gonzalez    Exercises  - Seated Shoulder Flexion Towel Slide at Table Top  - 3-4 x daily - 7 x weekly - 2 sets - 15 reps  - Seated Shoulder Abduction Towel Slide at Table Top  - 3-4 x daily - 7 x weekly - 2 sets - 15 reps  - Elbow AROM Flexion/Extension Forearm in Neutral in Supine  - 3-4 x daily - 7 x weekly - 2 sets - 15 reps  - Seated Forearm Pronation and Supination AROM  - 3-4 x daily - 7 x weekly - 2 sets - 15 reps  - Wrist AROM Flexion Extension  - 3-4 x daily - 7 x weekly - 2 sets - 15 reps  - Hand Towel Scrunching  - 3-4 x daily - 7 x weekly - 1 sets - 5-8 reps  - Wrist AAROM Flexion and Extension  - 3-4 x daily - 7 x weekly - 1 sets - 20 reps  - Forearm AAROM Supination and Pronation with Ball  - 3-4 x daily - 7 x weekly - 1 sets - 20 reps    ASSESSMENT     Pt has progressed with her wrist AROM. She still remains highly limited in her active elbow range of motion. She is also consistently unable to make a composite fist. Heavy education on importance of performing prescribed HEP outside of therapy time in order to regain her functional movement. Anticipate that due to the highly involved nature of her injury she will require continued OT at least 2-3x/week to help her regain her function for work and daily life.       PLAN OF CARE     Increase elbow AROM  Gentle strengthening with        GOALS     Short term goals:

## 2024-02-02 NOTE — PROGRESS NOTES
GVL PT [facility] ORTHOPAED[facility]  [address] [place] [id number]  Dept: [phone]      Physical Therapy Initial Assessment     Referring MD: Rosa Blanc MD  Diagnosis:     ICD-10-CM    1. Weakness generalized  R53.1       2. Decreased functional mobility and endurance  Z74.09       3. Stiffness of knee joint, left  M25.662       4. Chronic pain of left knee  M25.562     G89.29          Therapy precautions:Blood thinners  Co-morbidities affecting plan of care: s/p left s/p ORIF of distal humeral and radius fracture on 12/9/2023.  Brain surgery due to epilepsy in 2013, \"mild\" heart attack in 2018     Total Timed Procedure Codes: 25 min, Total Time: 55 min  Time In: 11:00 AM  Time Out: 11:55 AM  Visit: 1/12   Modifier needed: No    PERTINENT MEDICAL HISTORY     Past medical and surgical history:   Past Medical History:   Diagnosis Date    Epilepsy (HCC)     Heart attack (HCC) 2018    Seizures (HCC)       Past Surgical History:   Procedure Laterality Date    OTHER SURGICAL HISTORY  2013    Brain - for seizures (Epilepsy)     Medications: reviewed in chart   Allergies:   Allergies   Allergen Reactions    Tetracycline Anaphylaxis     Patient said when she was little she stopped breathing after taking     Tetracycline Anaphylaxis        SUBJECTIVE     Chief complaints/history of injury: Patient is a 53 y.o. female with a PMH complicated as noted above.      Date symptoms began: 07/2023  Nature of condition: Chronic (continuous duration > 3 months)  Primary cause of current episode: Traumatic  How did symptoms start: Patient reports c/o left knee pain.  States at the end of July 2023 she was moving which involved a lot of stair negotiation.  States she sought medical attention through her PCP about 1 month later d/t persistent pain.  States she had an x-ray which was negative for bone injury.  States symptoms appeared to be improving until 12/11/2023 when she experienced a fall.  States she

## 2024-02-05 ENCOUNTER — TREATMENT (OUTPATIENT)
Age: 54
End: 2024-02-05
Payer: MEDICARE

## 2024-02-05 ENCOUNTER — TREATMENT (OUTPATIENT)
Age: 54
End: 2024-02-05

## 2024-02-05 DIAGNOSIS — Z74.09 DECREASED FUNCTIONAL MOBILITY AND ENDURANCE: ICD-10-CM

## 2024-02-05 DIAGNOSIS — M25.522 ELBOW PAIN, LEFT: ICD-10-CM

## 2024-02-05 DIAGNOSIS — M25.662 STIFFNESS OF KNEE JOINT, LEFT: ICD-10-CM

## 2024-02-05 DIAGNOSIS — M25.642 STIFFNESS OF LEFT HAND JOINT: Primary | ICD-10-CM

## 2024-02-05 DIAGNOSIS — G89.29 CHRONIC PAIN OF LEFT KNEE: ICD-10-CM

## 2024-02-05 DIAGNOSIS — M25.562 CHRONIC PAIN OF LEFT KNEE: ICD-10-CM

## 2024-02-05 DIAGNOSIS — M79.89 SWELLING OF FINGER, LEFT: ICD-10-CM

## 2024-02-05 DIAGNOSIS — M25.532 WRIST PAIN, LEFT: ICD-10-CM

## 2024-02-05 DIAGNOSIS — R53.1 WEAKNESS GENERALIZED: Primary | ICD-10-CM

## 2024-02-05 PROCEDURE — 97140 MANUAL THERAPY 1/> REGIONS: CPT | Performed by: OCCUPATIONAL THERAPIST

## 2024-02-05 PROCEDURE — 97110 THERAPEUTIC EXERCISES: CPT | Performed by: OCCUPATIONAL THERAPIST

## 2024-02-05 NOTE — PROGRESS NOTES
GVL OT INT [facility] ORTHOPAED[facility]  [address] [place] [id number]  Dept: [phone]      Occupational Therapy Daily Note     Referring MD: Bobo Landaverde MD    Diagnosis:     ICD-10-CM    1. Stiffness of left hand joint  M25.642       2. Elbow pain, left  M25.522       3. Wrist pain, left  M25.532       4. Swelling of finger, left  M79.89               Surgery: Left distal radius fracture ORIF and left distal humerus  fracture ORIF  Date 12/19/23     Therapy precautions: Fracture precautions  EPILEPSY    History of injury/onset:  PPt sustained a fall on ice on 12/11/23 in which she sustained a distal humerus and distal radius fracture t [place] [facility] [facility] [place] at the time. Pt sustained a fall on ice on 12/11/23 in which she sustained a distal humerus and distal radius fracture     Direct Treatment Time: 60 min                       Total In Office Time: 65 min  Modifier needed: No  Episode visit count:  10     Preferred Name:  Maryanne       SUBJECTIVE     Consistent swelling in the fingers reported. She states she started back at work today.     OBJECTIVE     2/2/24 Functional Outcome Measure:       A/PROM Measures:  A/PROM: AROM : IE  Involved Side    Shoulder screen Mildly limited/tight                                        2/2/24        Elbow extension/flexion WNL°  -45/94°  -40/89    Supination/Pronation 76/85°  15/52°  55/70                                                                 R                 L                 L 2/2/24  Opposition (Kapandji): 10 1       Digit ext/flexion screen  8cm to DPC (all digits)  4cm to DPC      Wrist A/PROM: LEFT  2/2/24    Wrist Extension/Flexion 28/21°         Treatment:       Therapeutic exercise (11545) x 20 min:  Extensive education on OT POC/rationale, explanation of interventions taken while performing (continued)   UBE x3 minutes no resistance   Active assisted elbow range of

## 2024-02-05 NOTE — PROGRESS NOTES
which shows patient having decreased functional deficit related to knee injury.   Patient will achieve an average score on the Patient-Specific Functional Scale ? 7/10 indicating improving functional mobility.  Discharged from PT.     [facility]

## 2024-02-07 ENCOUNTER — PROCEDURE VISIT (OUTPATIENT)
Dept: OBGYN CLINIC | Age: 54
End: 2024-02-07
Payer: MEDICARE

## 2024-02-07 VITALS
DIASTOLIC BLOOD PRESSURE: 76 MMHG | SYSTOLIC BLOOD PRESSURE: 122 MMHG | HEIGHT: 72 IN | BODY MASS INDEX: 36.3 KG/M2 | WEIGHT: 268 LBS

## 2024-02-07 DIAGNOSIS — I82.4Y2 ACUTE DEEP VEIN THROMBOSIS (DVT) OF PROXIMAL VEIN OF LEFT LOWER EXTREMITY (HCC): ICD-10-CM

## 2024-02-07 DIAGNOSIS — N92.1 MENORRHAGIA WITH IRREGULAR CYCLE: Primary | ICD-10-CM

## 2024-02-07 LAB
BASOPHILS # BLD: 0 K/UL (ref 0–0.2)
BASOPHILS NFR BLD: 0 % (ref 0–2)
DIFFERENTIAL METHOD BLD: ABNORMAL
EOSINOPHIL # BLD: 0.1 K/UL (ref 0–0.8)
EOSINOPHIL NFR BLD: 1 % (ref 0.5–7.8)
ERYTHROCYTE [DISTWIDTH] IN BLOOD BY AUTOMATED COUNT: 16.2 % (ref 11.9–14.6)
FERRITIN SERPL-MCNC: 11 NG/ML (ref 8–388)
FOLATE SERPL-MCNC: 25.4 NG/ML (ref 3.1–17.5)
HCT VFR BLD AUTO: 31.2 % (ref 35.8–46.3)
HGB BLD-MCNC: 9.6 G/DL (ref 11.7–15.4)
HGB RETIC QN AUTO: 34 PG (ref 29–35)
IMM GRANULOCYTES # BLD AUTO: 0 K/UL (ref 0–0.5)
IMM GRANULOCYTES NFR BLD AUTO: 0 % (ref 0–5)
IMM RETICS NFR: 31.8 % (ref 3–15.9)
IRON SATN MFR SERPL: 23 %
IRON SERPL-MCNC: 76 UG/DL (ref 35–150)
LYMPHOCYTES # BLD: 1.4 K/UL (ref 0.5–4.6)
LYMPHOCYTES NFR BLD: 20 % (ref 13–44)
MCH RBC QN AUTO: 30.3 PG (ref 26.1–32.9)
MCHC RBC AUTO-ENTMCNC: 30.8 G/DL (ref 31.4–35)
MCV RBC AUTO: 98.4 FL (ref 82–102)
MONOCYTES # BLD: 0.6 K/UL (ref 0.1–1.3)
MONOCYTES NFR BLD: 8 % (ref 4–12)
NEUTS SEG # BLD: 4.9 K/UL (ref 1.7–8.2)
NEUTS SEG NFR BLD: 70 % (ref 43–78)
NRBC # BLD: 0 K/UL (ref 0–0.2)
PLATELET # BLD AUTO: 312 K/UL (ref 150–450)
PMV BLD AUTO: 10.4 FL (ref 9.4–12.3)
RBC # BLD AUTO: 3.17 M/UL (ref 4.05–5.2)
RETICS # AUTO: 0.28 M/UL (ref 0.03–0.1)
RETICS/RBC NFR AUTO: 8.7 % (ref 0.3–2)
TIBC SERPL-MCNC: 327 UG/DL (ref 250–450)
VIT B12 SERPL-MCNC: 431 PG/ML (ref 193–986)
WBC # BLD AUTO: 7 K/UL (ref 4.3–11.1)

## 2024-02-07 PROCEDURE — 99204 OFFICE O/P NEW MOD 45 MIN: CPT | Performed by: OBSTETRICS & GYNECOLOGY

## 2024-02-07 RX ORDER — MEDROXYPROGESTERONE ACETATE 10 MG/1
10 TABLET ORAL DAILY
COMMUNITY
End: 2024-02-07

## 2024-02-07 RX ORDER — ACETAMINOPHEN 160 MG
TABLET,DISINTEGRATING ORAL
COMMUNITY

## 2024-02-07 RX ORDER — MEDROXYPROGESTERONE ACETATE 10 MG/1
10 TABLET ORAL DAILY
Qty: 30 TABLET | Refills: 3 | Status: SHIPPED | OUTPATIENT
Start: 2024-02-07

## 2024-02-07 ASSESSMENT — ENCOUNTER SYMPTOMS
RESPIRATORY NEGATIVE: 1
EYES NEGATIVE: 1
GASTROINTESTINAL NEGATIVE: 1
ALLERGIC/IMMUNOLOGIC NEGATIVE: 1

## 2024-02-07 NOTE — PROGRESS NOTES
Multiple Vitamins-Minerals (MULTIVITAL PO), Take by mouth, Disp: , Rfl:     Ferrous Gluconate (IRON 27 PO), Take by mouth, Disp: , Rfl:     Cholecalciferol (VITAMIN D3) 50 MCG (2000 UT) CAPS, Take by mouth, Disp: , Rfl:     medroxyPROGESTERone (PROVERA) 10 MG tablet, Take 1 tablet by mouth daily, Disp: , Rfl:     rivaroxaban 15 & 20 MG Starter Pack, Use as directed, Disp: 51 each, Rfl: 0    ibuprofen (ADVIL;MOTRIN) 800 MG tablet, Take 1 tablet by mouth every 6 hours as needed for Pain, Disp: 20 tablet, Rfl: 0    EPINEPHrine (EPIPEN) 0.3 MG/0.3ML SOAJ injection, INJECT 0.3MG (0.3ML) INTO THE MUSCLE AS DIRECTED AS NEEDED FOR ANAPHYLASIX AS NEEDED (Patient not taking: Reported on 2/7/2024), Disp: , Rfl:     Family Cancer History:   Cancer-related family history is not on file.     Social History:  reports that she has never smoked. She has never used smokeless tobacco. She reports that she does not drink alcohol and does not use drugs.    Ob History:  No obstetric history on file.     Sexual History:  reports that she is not currently sexually active. She reports using the following method of birth control/protection: None.    PHYSICAL EXAM:  Vitals:  height is 1.829 m (6') and weight is 121.6 kg (268 lb). Her blood pressure is 122/76.  Body mass index is 36.35 kg/m².  Physical Exam  Constitutional:       General: She is awake. She is not in acute distress.     Appearance: Normal appearance. She is not ill-appearing.   HENT:      Head: Normocephalic and atraumatic.   Eyes:      Conjunctiva/sclera: Conjunctivae normal.   Cardiovascular:      Rate and Rhythm: Normal rate.   Pulmonary:      Effort: Pulmonary effort is normal.   Musculoskeletal:         General: Normal range of motion.      Cervical back: Normal range of motion.   Skin:     General: Skin is warm and dry.   Neurological:      General: No focal deficit present.      Mental Status: She is alert and oriented to person, place, and time.      Motor: Motor

## 2024-02-09 ENCOUNTER — TREATMENT (OUTPATIENT)
Age: 54
End: 2024-02-09
Payer: MEDICARE

## 2024-02-09 DIAGNOSIS — M25.532 WRIST PAIN, LEFT: ICD-10-CM

## 2024-02-09 DIAGNOSIS — M25.522 ELBOW PAIN, LEFT: ICD-10-CM

## 2024-02-09 DIAGNOSIS — M25.642 STIFFNESS OF LEFT HAND JOINT: Primary | ICD-10-CM

## 2024-02-09 DIAGNOSIS — M79.89 SWELLING OF FINGER, LEFT: ICD-10-CM

## 2024-02-09 PROCEDURE — 97140 MANUAL THERAPY 1/> REGIONS: CPT | Performed by: OCCUPATIONAL THERAPIST

## 2024-02-09 NOTE — PROGRESS NOTES
GVL OT [facility] ORTHOPAED[facility]  [address] [place] [id number]  Dept: [phone]      Occupational Therapy Daily Note     Referring MD: Bobo Landaverde MD    Diagnosis:     ICD-10-CM    1. Stiffness of left hand joint  M25.642       2. Elbow pain, left  M25.522       3. Wrist pain, left  M25.532       4. Swelling of finger, left  M79.89               Surgery: Left distal radius fracture ORIF and left distal humerus  fracture ORIF  Date 12/19/23     Therapy precautions: Fracture precautions  EPILEPSY    History of injury/onset:  PPt sustained a fall on ice on 12/11/23 in which she sustained a distal humerus and distal radius fracture t has most of her medical care at [facility], which she elected to have surgery at [facility] for this at the time. Pt sustained a fall on ice on 12/11/23 in which she sustained a distal humerus and distal radius fracture     Direct Treatment Time: 70 min                       Total In Office Time:  80 min  Modifier needed: No  Episode visit count:  11     Preferred Name:  Maryanne       FREYA     Pt reports some concern regarding her frequency of OT and referral per her surgeon. Pt states she just wants use out of her arm back. She reports she has not been able to do as many of her exercises today due to work.     OBJECTIVE     2/2/24 Functional Outcome Measure:       A/PROM Measures:  A/PROM: AROM : IE  Involved Side    Shoulder screen Mildly limited/tight                                        2/2/24        Elbow extension/flexion WNL°  -45/94°  -40/89    Supination/Pronation 76/85°  15/52°  55/70                                                                 R                 L                 L 2/2/24  Opposition (Berryandji): 10 1       Digit ext/flexion screen  8cm to DPC (all digits)  4cm to DPC      Wrist A/PROM: LEFT  2/2/24    Wrist Extension/Flexion 28/21°         Treatment:         Manual Treatment: x 70 min:   Mulligan belt for distraction elbow at 90 and

## 2024-02-12 ENCOUNTER — TREATMENT (OUTPATIENT)
Age: 54
End: 2024-02-12
Payer: MEDICARE

## 2024-02-12 DIAGNOSIS — M25.642 STIFFNESS OF LEFT HAND JOINT: Primary | ICD-10-CM

## 2024-02-12 DIAGNOSIS — M25.522 ELBOW PAIN, LEFT: ICD-10-CM

## 2024-02-12 DIAGNOSIS — M79.89 SWELLING OF FINGER, LEFT: ICD-10-CM

## 2024-02-12 DIAGNOSIS — M25.532 WRIST PAIN, LEFT: ICD-10-CM

## 2024-02-12 PROCEDURE — 97140 MANUAL THERAPY 1/> REGIONS: CPT | Performed by: OCCUPATIONAL THERAPIST

## 2024-02-14 ENCOUNTER — TREATMENT (OUTPATIENT)
Age: 54
End: 2024-02-14
Payer: MEDICARE

## 2024-02-14 DIAGNOSIS — M79.89 SWELLING OF FINGER, LEFT: ICD-10-CM

## 2024-02-14 DIAGNOSIS — M25.532 WRIST PAIN, LEFT: ICD-10-CM

## 2024-02-14 DIAGNOSIS — M25.642 STIFFNESS OF LEFT HAND JOINT: Primary | ICD-10-CM

## 2024-02-14 DIAGNOSIS — M25.522 ELBOW PAIN, LEFT: ICD-10-CM

## 2024-02-14 PROCEDURE — 97110 THERAPEUTIC EXERCISES: CPT | Performed by: OCCUPATIONAL THERAPIST

## 2024-02-14 PROCEDURE — 97022 WHIRLPOOL THERAPY: CPT | Performed by: OCCUPATIONAL THERAPIST

## 2024-02-14 PROCEDURE — 97140 MANUAL THERAPY 1/> REGIONS: CPT | Performed by: OCCUPATIONAL THERAPIST

## 2024-02-14 NOTE — PROGRESS NOTES
GVL OT [facility]  [address] [place] [id number]  Dept: [phone]      Occupational Therapy Daily Note     Referring MD: Bobo Landaverde MD    Diagnosis:     ICD-10-CM    1. Stiffness of left hand joint  M25.642       2. Elbow pain, left  M25.522       3. Swelling of finger, left  M79.89       4. Wrist pain, left  M25.532               Surgery: Left distal radius fracture ORIF and left distal humerus  fracture ORIF  Date 12/19/23     Therapy precautions: Fracture precautions  EPILEPSY    History of injury/onset:  PPt sustained a fall on ice on 12/11/23 in which she sustained a distal humerus and distal radius fracture t has most of her medical [place] at [facility], which she elected to have surgery at [facility] for this at the time. Pt sustained a fall on ice on 12/11/23 in which she sustained a distal humerus and distal radius fracture     Direct Treatment Time: 90 min                       Total In Office Time:  100 min  Modifier needed: No  Episode visit count:  13     Preferred Name:  Maryanne       SUBJECTIVE     Pt reports that she thinks pushing on her fingers into flexion is too much pain. She wants another technique to make a fist. We discussed that there is some discomfort expected when trying to regain movement but she can try progressive stretching on her own with reassessing movement (passive fist) every minute or so to add further tolerable stretch to promote composite fist.     OBJECTIVE     2/2/24 Functional Outcome Measure:       A/PROM Measures:  A/PROM: AROM : IE  Involved Side    Shoulder screen Mildly limited/tight                                        2/2/24 2/14/24       Elbow extension/flexion WNL°  -45/94°  -40/89    Supination/Pronation 76/85°  15/52°  55/70                                                                 R                 L                 L 2/2/24  Opposition (Ananji): 10 1       Digit ext/flexion screen  8cm to DPC (all digits)  4cm

## 2024-02-14 NOTE — PROGRESS NOTES
GVL OT [facility]  [address] [place] [id number]  Dept: [phone]      Occupational Therapy Daily Note     Referring MD: Bobo Landaverde MD    Diagnosis:     ICD-10-CM    1. Stiffness of left hand joint  M25.642       2. Elbow pain, left  M25.522       3. Swelling of finger, left  M79.89       4. Wrist pain, left  M25.532               Surgery: Left distal radius fracture ORIF and left distal humerus  fracture ORIF  Date 12/19/23     Therapy precautions: Fracture precautions  EPILEPSY    History of injury/onset:  PPt sustained a fall on ice on 12/11/23 in which she sustained a distal humerus and distal radius fracture t [place] [facility] which [place] [facility] [place] at the time. Pt sustained a fall on ice on 12/11/23 in which she sustained a distal humerus and distal radius fracture     Direct Treatment Time: 85 min                       Total In Office Time:  90 min  Modifier needed: No  Episode visit count:  12     Preferred Name:  Maryanne       SUBJECTIVE     Pt reports that she feels like she possibly made improvements over the weekend, stating \"I worked it hard.\"     OBJECTIVE     2/2/24 Functional Outcome Measure:       A/PROM Measures:  A/PROM: AROM : IE  Involved Side    Shoulder screen Mildly limited/tight                                        2/2/24        Elbow extension/flexion WNL°  -45/94°  -40/89    Supination/Pronation 76/85°  15/52°  55/70                                                                 R                 L                 L 2/2/24  Opposition (Annaji): 10 1       Digit ext/flexion screen  8cm to DPC (all digits)  4cm to DPC      Wrist A/PROM: LEFT  2/2/24    Wrist Extension/Flexion 28/21°         Treatment:         Manual Treatment: x 85 min:   Mulligan belt for distraction elbow at 90 and therapist gentle counter pressure, slight elbow flexion for mobilization prior to ROM and PROM  Distraction and

## 2024-02-15 NOTE — PROGRESS NOTES
GVL PT [facility]  [address] [place] [id number]  Dept: [phone]      Physical Therapy Daily Note     Referring MD: Rosa Blanc MD  Diagnosis:     ICD-10-CM    1. Weakness generalized  R53.1       2. Decreased functional mobility and endurance  Z74.09       3. Stiffness of knee joint, left  M25.662       4. Chronic pain of left knee  M25.562     G89.29            Therapy precautions:Blood thinners  Co-morbidities affecting plan of care: s/p left s/p ORIF of distal humeral and radius fracture on 12/9/2023.  Brain surgery due to epilepsy in 2013, \"mild\" heart attack in 2018     Total Timed Procedure Codes: 49 min, Total Time: 50 min    Visit: Visit count could not be calculated. Make sure you are using a visit which is associated with an episode./12   Modifier needed: No    PERTINENT MEDICAL HISTORY     Past medical and surgical history:   Past Medical History:   Diagnosis Date    DVT (deep venous thrombosis) ([facility])     left leg    Epilepsy (HCC)     Heart attack (HCC) 2018    Seizures (HCC)       Past Surgical History:   Procedure Laterality Date    ORTHOPEDIC SURGERY Left     wrist/elbow    OTHER SURGICAL HISTORY  2013    Brain - for seizures (Epilepsy)     Medications: reviewed in chart   Allergies:   Allergies   Allergen Reactions    Tetracycline Anaphylaxis     Patient said when she was little she stopped breathing after taking       Chief complaints/history of injury: Patient is a 53 y.o. female with a PMH complicated as noted above.        Pain Assessment:    Pain Characteristics   Intensity: 0-7/10   Location: Diffuse left knee   Description: Sharp however mostly a dull pain   How often do you feel symptoms: Frequently (51-75%)   Aggravating factors: Standing, sit to stand transfers, stair negotiation   Alleviating factors: Elevation, rest       Patient Stated Goals:   Return to premorbid walking routine (3-5 miles, 3-4x/week)  Be able to tolerate different

## 2024-02-16 ENCOUNTER — TREATMENT (OUTPATIENT)
Age: 54
End: 2024-02-16
Payer: MEDICARE

## 2024-02-16 ENCOUNTER — TREATMENT (OUTPATIENT)
Age: 54
End: 2024-02-16

## 2024-02-16 DIAGNOSIS — M79.89 SWELLING OF FINGER, LEFT: ICD-10-CM

## 2024-02-16 DIAGNOSIS — M25.642 STIFFNESS OF LEFT HAND JOINT: Primary | ICD-10-CM

## 2024-02-16 DIAGNOSIS — M25.662 STIFFNESS OF KNEE JOINT, LEFT: ICD-10-CM

## 2024-02-16 DIAGNOSIS — M25.522 ELBOW PAIN, LEFT: ICD-10-CM

## 2024-02-16 DIAGNOSIS — Z74.09 DECREASED FUNCTIONAL MOBILITY AND ENDURANCE: ICD-10-CM

## 2024-02-16 DIAGNOSIS — M25.562 CHRONIC PAIN OF LEFT KNEE: ICD-10-CM

## 2024-02-16 DIAGNOSIS — G89.29 CHRONIC PAIN OF LEFT KNEE: ICD-10-CM

## 2024-02-16 DIAGNOSIS — R53.1 WEAKNESS GENERALIZED: Primary | ICD-10-CM

## 2024-02-16 DIAGNOSIS — M25.532 WRIST PAIN, LEFT: ICD-10-CM

## 2024-02-16 PROCEDURE — 97140 MANUAL THERAPY 1/> REGIONS: CPT | Performed by: OCCUPATIONAL THERAPIST

## 2024-02-16 PROCEDURE — 97110 THERAPEUTIC EXERCISES: CPT | Performed by: OCCUPATIONAL THERAPIST

## 2024-02-19 ENCOUNTER — TREATMENT (OUTPATIENT)
Age: 54
End: 2024-02-19

## 2024-02-19 DIAGNOSIS — M25.662 STIFFNESS OF KNEE JOINT, LEFT: ICD-10-CM

## 2024-02-19 DIAGNOSIS — R53.1 WEAKNESS GENERALIZED: Primary | ICD-10-CM

## 2024-02-19 DIAGNOSIS — Z74.09 DECREASED FUNCTIONAL MOBILITY AND ENDURANCE: ICD-10-CM

## 2024-02-19 DIAGNOSIS — G89.29 CHRONIC PAIN OF LEFT KNEE: ICD-10-CM

## 2024-02-19 DIAGNOSIS — M25.562 CHRONIC PAIN OF LEFT KNEE: ICD-10-CM

## 2024-02-19 NOTE — PROGRESS NOTES
GVL OT [facility]  [address] [place] [id number]  Dept: [phone]      Occupational Therapy Daily Note     Referring MD: Bobo Landaverde MD    Diagnosis:     ICD-10-CM    1. Stiffness of left hand joint  M25.642       2. Elbow pain, left  M25.522       3. Swelling of finger, left  M79.89       4. Wrist pain, left  M25.532                 Surgery: Left distal radius fracture ORIF and left distal humerus  fracture ORIF  Date 12/19/23     Therapy precautions: Fracture precautions  EPILEPSY    History of injury/onset:  PPt sustained a fall on ice on 12/11/23 in which she sustained a distal humerus and distal radius fracture t [place] most of her [place] [facility], which she elected to have surgery at [facility] for [place] at the time. Pt sustained a fall on ice on 12/11/23 in which she sustained a distal humerus and distal radius fracture     Direct Treatment Time: 60 min                       Total In Office Time:  65 min  Modifier needed: No  Episode visit count:  14     Preferred Name:  Maryanne       SUBJECTIVE     Pt reports she has a hard time stretching herself outside of therapy. She has been holding onto a 1-2lb DB for weighted elbow flexion stretch.     OBJECTIVE     2/2/24 Functional Outcome Measure:       A/PROM Measures:  A/PROM: AROM : IE  Involved Side    Shoulder screen Mildly limited/tight                                        2/2/24          Elbow extension/flexion WNL°  -45/94°  -40/89    Supination/Pronation 76/85°  15/52°  55/70                                                                 R                 L                 L 2/2/24  Opposition (Annaji): 10 1       Digit ext/flexion screen  8cm to DPC (all digits)  4cm to DPC      Wrist A/PROM: LEFT  2/2/24    Wrist Extension/Flexion 28/21°         Treatment:     Therapeutic exercise (84494) x 30 min:  Low load/long duration elbow stretch with shoulder flexed to 90 degrees, therapist supporting   2# wrist weight,

## 2024-02-19 NOTE — PROGRESS NOTES
coordination, kinesthetic sense, posture and proprioception  Home exercise program (HEP) development  Modalities prn to address pain, spasms, and swelling: (97060) Vasopneumatic compression    Access Code: [email or web address]  URL: [email or web address]  Date: 02/04/2024  Prepared by: Yanira Tucker DPT    Exercises  - Seated Hamstring Stretch  - 1-2 x daily - 7 x weekly - 1 sets - 5 reps - 12 seconds hold  - Seated Calf Stretch with Strap  - 1-2 x daily - 7 x weekly - 1 sets - 5 reps - 12 seconds hold  - Sitting Heel Slide with Towel  - 1-2 x daily - 7 x weekly - 1 sets - 10 reps - 5 seconds hold  - Active Straight Leg Raise with Quad Set  - 1 x daily - 5 x weekly - 3 sets - 10 reps  - Sidelying Hip Abduction  - 1 x daily - 5 x weekly - 3 sets - 10 reps  - Prone Hip Extension  - 1 x daily - 5 x weekly - 3 sets - 10 reps  - Ice  - 2 x daily - 7 x weekly - 15-20 duration (minutes)      [facility]

## 2024-02-20 ENCOUNTER — TREATMENT (OUTPATIENT)
Age: 54
End: 2024-02-20

## 2024-02-20 DIAGNOSIS — M25.532 WRIST PAIN, LEFT: ICD-10-CM

## 2024-02-20 DIAGNOSIS — M79.89 SWELLING OF FINGER, LEFT: ICD-10-CM

## 2024-02-20 DIAGNOSIS — M25.642 STIFFNESS OF LEFT HAND JOINT: Primary | ICD-10-CM

## 2024-02-20 DIAGNOSIS — M25.522 ELBOW PAIN, LEFT: ICD-10-CM

## 2024-02-20 NOTE — PROGRESS NOTES
G[facility] OT [facility]  [address] [place] [id number]  Dept: [phone]      Occupational Therapy Daily Note     Referring MD: Bobo Landaverde MD    Diagnosis:     ICD-10-CM    1. Stiffness of left hand joint  M25.642       2. Elbow pain, left  M25.522       3. Swelling of finger, left  M79.89       4. Wrist pain, left  M25.532                   Surgery: Left distal radius fracture ORIF and left distal humerus  fracture ORIF  Date 12/19/23     Therapy precautions: Fracture precautions  EPILEPSY    History of injury/onset:  PPt sustained a fall on ice on 12/11/23 in which she sustained a distal humerus and distal radius fracture t has most of her medical care at [facility], which she elected to have surgery at [facility] for this at the time. 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 She feels more stiff than usual. She used her MP blocking tool over the weekend but did not at work today.     OBJECTIVE     2/2/24 Functional Outcome Measure:       A/PROM Measures:  A/PROM: AROM : IE  Involved Side    Shoulder screen Mildly limited/tight                                        2/2/24 2/20/24  Elbow extension/flexion WNL°  -45/94°  -40/89 /110   Supination/Pronation 76/85°  15/52°  55/70                                                                 R                 L                 L 2/2/24  Opposition (Annaji): 10 1       Digit ext/flexion screen  8cm to DPC (all digits)  4cm to DPC      Wrist A/PROM: LEFT  2/2/24 LEFT  2/20/24   Wrist Extension/Flexion 28/21°  42/21     Digital AROM: IE IF MF RF SF   MCP 85°  83°  81°  80°    PIP 84°  95°  80°  80°    DIP 38°  47°  35°  35°          Treatment:

## 2024-02-21 ENCOUNTER — OFFICE VISIT (OUTPATIENT)
Dept: OBGYN CLINIC | Age: 54
End: 2024-02-21
Payer: MEDICARE

## 2024-02-21 VITALS
HEIGHT: 72 IN | WEIGHT: 268 LBS | SYSTOLIC BLOOD PRESSURE: 138 MMHG | BODY MASS INDEX: 36.3 KG/M2 | DIASTOLIC BLOOD PRESSURE: 80 MMHG

## 2024-02-21 DIAGNOSIS — D50.0 IRON DEFICIENCY ANEMIA DUE TO CHRONIC BLOOD LOSS: ICD-10-CM

## 2024-02-21 DIAGNOSIS — I82.401 DEEP VEIN THROMBOSIS (DVT) OF RIGHT LOWER EXTREMITY, UNSPECIFIED CHRONICITY, UNSPECIFIED VEIN (HCC): ICD-10-CM

## 2024-02-21 DIAGNOSIS — D21.9 FIBROIDS: ICD-10-CM

## 2024-02-21 DIAGNOSIS — N92.1 MENORRHAGIA WITH IRREGULAR CYCLE: Primary | ICD-10-CM

## 2024-02-21 PROCEDURE — 99213 OFFICE O/P EST LOW 20 MIN: CPT | Performed by: OBSTETRICS & GYNECOLOGY

## 2024-02-21 ASSESSMENT — ENCOUNTER SYMPTOMS
ALLERGIC/IMMUNOLOGIC NEGATIVE: 1
EYES NEGATIVE: 1
RESPIRATORY NEGATIVE: 1
GASTROINTESTINAL NEGATIVE: 1

## 2024-02-21 NOTE — PROGRESS NOTES
Name: Maryanne Modi  Date: 2/21/2024  YOB: 1970  LMP: Patient's last menstrual period was 01/24/2024.      Maryanne is a 53 y.o. G0  who is here for a follow up for Severe Menorrhagia on anticoagulants for recent DVT, large fibroids on Us .  Doing better with bleeding last cycle.  Appt with maxine in march. Taking progesterone.     Birth control:  never been active  Menstrual status: irregular cycles and heavy for 2 weeks  Gyn Surgery: none     Health Maintenance:  Pap Smear: last pap in  never  If 40 or older, Mammo: last mammo in 10/31/23, pathology Negative   If 45 or older, Colonoscopy:  never  If postmenopausal, Dexa scan: not needed    Review of Systems   Constitutional: Negative.    HENT: Negative.     Eyes: Negative.    Respiratory: Negative.     Cardiovascular: Negative.    Gastrointestinal: Negative.    Endocrine: Negative.    Genitourinary: Negative.    Musculoskeletal: Negative.    Skin: Negative.    Allergic/Immunologic: Negative.    Hematological: Negative.    Psychiatric/Behavioral: Negative.  Negative for self-injury and suicidal ideas.         Past Medical History:  No date: DVT (deep venous thrombosis) (HCC)      Comment:  left leg  No date: Epilepsy (HCC)  2018: Heart attack (HCC)  No date: Seizures (HCC)     Past Surgical History:  No date: ORTHOPEDIC SURGERY; Left      Comment:  wrist/elbow  2013: OTHER SURGICAL HISTORY      Comment:  Brain - for seizures (Epilepsy)       Current Outpatient Medications:     Multiple Vitamins-Minerals (MULTIVITAL PO), Take by mouth, Disp: , Rfl:     Ferrous Gluconate (IRON 27 PO), Take by mouth, Disp: , Rfl:     Cholecalciferol (VITAMIN D3) 50 MCG (2000 UT) CAPS, Take by mouth, Disp: , Rfl:     medroxyPROGESTERone (PROVERA) 10 MG tablet, Take 1 tablet by mouth daily, Disp: 30 tablet, Rfl: 3    rivaroxaban 15 & 20 MG Starter Pack, Use as directed, Disp: 51 each, Rfl: 0    EPINEPHrine (EPIPEN) 0.3 MG/0.3ML SOAJ injection, , Disp: , Rfl:

## 2024-02-22 ENCOUNTER — TREATMENT (OUTPATIENT)
Age: 54
End: 2024-02-22

## 2024-02-22 DIAGNOSIS — M25.532 WRIST PAIN, LEFT: ICD-10-CM

## 2024-02-22 DIAGNOSIS — M25.522 ELBOW PAIN, LEFT: Primary | ICD-10-CM

## 2024-02-22 DIAGNOSIS — M25.642 STIFFNESS OF LEFT HAND JOINT: ICD-10-CM

## 2024-02-22 NOTE — PROGRESS NOTES
joint mobility  Therapeutic exercise (56379) x 15 min:  MP block  recruit FDP during active fist   Therapist assist manual blocking  Extensive stretching to elbow, wrist and hand by therapist        Manual Treatment: x 40 min:   Mulligan belt for distraction and  Manual posterior elbow distraction by therapist elbow at 90 and therapist gentle counter pressure, slight elbow flexion for mobilization prior to ROM and PROM  Mulligan belt for supination followed by extensive stretching and manual mobilizations to radial head posteriorly  Distraction and oscillations to the elbow with passive extension  Extensive distraction and posterior mobilization of elbow joint   Wrist distraction and PROM  Entire hand distraction mobilizations ant/post all Ips, Mps  Metacarpal piano key mobilization  Overhead elbow flexion with firmer overpressure, gravity assisted   Cupping to posterior   PROM all digits to tolerance, progress towards composite fist  Extrinsic and intrinsic lengthening of all fingers   Access Code: [email or web address]  URL: [email or web address]  Date: 01/15/2024  Prepared by: Love Gonzalez    Exercises  - Seated Shoulder Flexion Towel Slide at Table Top  - 3-4 x daily - 7 x weekly - 2 sets - 15 reps  - Seated Shoulder Abduction Towel Slide at Table Top  - 3-4 x daily - 7 x weekly - 2 sets - 15 reps  - Elbow AROM Flexion/Extension Forearm in Neutral in Supine  - 3-4 x daily - 7 x weekly - 2 sets - 15 reps  - Seated Forearm Pronation and Supination AROM  - 3-4 x daily - 7 x weekly - 2 sets - 15 reps  - Wrist AROM Flexion Extension  - 3-4 x daily - 7 x weekly - 2 sets - 15 reps  - Hand Towel Scrunching  - 3-4 x daily - 7 x weekly - 1 sets - 5-8 reps  - Wrist AAROM Flexion and Extension  - 3-4 x daily - 7 x weekly - 1 sets - 20 reps  - Forearm AAROM Supination and Pronation with Ball  - 3-4 x daily - 7 x weekly - 1 sets - 20 reps    ASSESSMENT     Patient responded well to all mobilizations and had significantly

## 2024-02-26 ENCOUNTER — TREATMENT (OUTPATIENT)
Age: 54
End: 2024-02-26
Payer: COMMERCIAL

## 2024-02-26 DIAGNOSIS — M25.532 WRIST PAIN, LEFT: ICD-10-CM

## 2024-02-26 DIAGNOSIS — M25.642 STIFFNESS OF LEFT HAND JOINT: ICD-10-CM

## 2024-02-26 DIAGNOSIS — M25.522 ELBOW PAIN, LEFT: Primary | ICD-10-CM

## 2024-02-26 PROCEDURE — 97022 WHIRLPOOL THERAPY: CPT | Performed by: OCCUPATIONAL THERAPIST

## 2024-02-26 PROCEDURE — 97110 THERAPEUTIC EXERCISES: CPT | Performed by: OCCUPATIONAL THERAPIST

## 2024-02-26 PROCEDURE — 97140 MANUAL THERAPY 1/> REGIONS: CPT | Performed by: OCCUPATIONAL THERAPIST

## 2024-02-26 NOTE — PROGRESS NOTES
assessment score from 77% deficit to less than 40% deficit. Not fully met      Long term goals:  4/14/2024 (90 days)   Pt will be able to use affected  UE for all ADL's without difficulty 75% of the time.  Decrease edema affected elbow to minimal.  Pt will have at least 135 °  of AROM affected elbow flexion to improve function with tasks like feeding and lifting.  Pt will have at least 70 °  of AROM affected forearm supination to improve ability  with tasks like opening doors, hold a plate.  Pt will have at least 70 °  of AROM affected forearm pronation to improve ability with tasks like writing and placing/acquiring items on tabletop.  Pt will have at least 40 psi of  strength affected  hand to improve ability to grasp and hold an object.  Pt will demonstrate WFL wrist AROM in order to perform all required daily activities.   Pt will be able to lift and carry items (ie grocery bags, laundry basket etc) with affected UE pain 2 of 10 or less.  Pt will be able to sleep through the night with no pain in affected UE.  Pts Quick DASH functional assessment score will be less than 20% functional deficit.     [facility] Portal     OT Protocols

## 2024-02-27 NOTE — PROGRESS NOTES
despite stiffness in comparison to her unaffected right side.     PLAN OF CARE     Continue with more aggressive end ROM elbow and hand, wrist and carry over into home.  Joint mobilizations elbow, wrist and hand for lubrication and to increase extensibility.  Strengthening as tolerated.       GOALS     Short term goals:  2/12/2024  (4 weeks)  Patient will be I with HEP.  No pain at rest to affected elbow. MET  Increase AROM affected elbow flexion to at least 120 ° to improve function with feeding. Not fully met, flexion seems to be one of the most limited movements NOT met, elbow flexion remains significantly limited to about  degrees despite extensive manual tx   Increase AROM affected elbow extension to at least -20 ° to improve function with reaching and donning LE dressing. MET   Increase AROM affected wrist extension to at least 40 degrees in order to perform functional tasks. MET  Increase AROM affected wrist flexion to at least 35 degrees. MET  Increase AROM affected forearm supination to at least 60 ° to improve function for facial self care. MET  Increase AROM affected  forearm pronation to at least 65 ° to improve function for typing and table top activities. MET  Pt will be able to make a full composite fist and thumb opposition with the affected  hand so that pt is able to grasp and hold objects. Not fully met, FULL PASSIVE PRESENT THIS DATE  Improve Quick DASH functional assessment score from 77% deficit to less than 40% deficit. Not fully met      Long term goals:  4/14/2024 (90 days)   Pt will be able to use affected  UE for all ADL's without difficulty 75% of the time.  Decrease edema affected elbow to minimal.  Pt will have at least 135 °  of AROM affected elbow flexion to improve function with tasks like feeding and lifting.  Pt will have at least 70 °  of AROM affected forearm supination to improve ability  with tasks like opening doors, hold a plate.  Pt will have at least 70 °  of AROM

## 2024-02-28 ENCOUNTER — TREATMENT (OUTPATIENT)
Age: 54
End: 2024-02-28

## 2024-02-28 ENCOUNTER — TREATMENT (OUTPATIENT)
Age: 54
End: 2024-02-28
Payer: COMMERCIAL

## 2024-02-28 DIAGNOSIS — R53.1 WEAKNESS GENERALIZED: Primary | ICD-10-CM

## 2024-02-28 DIAGNOSIS — M25.532 WRIST PAIN, LEFT: ICD-10-CM

## 2024-02-28 DIAGNOSIS — M25.662 STIFFNESS OF KNEE JOINT, LEFT: ICD-10-CM

## 2024-02-28 DIAGNOSIS — M25.642 STIFFNESS OF LEFT HAND JOINT: ICD-10-CM

## 2024-02-28 DIAGNOSIS — M25.522 ELBOW PAIN, LEFT: Primary | ICD-10-CM

## 2024-02-28 DIAGNOSIS — M25.562 CHRONIC PAIN OF LEFT KNEE: ICD-10-CM

## 2024-02-28 DIAGNOSIS — G89.29 CHRONIC PAIN OF LEFT KNEE: ICD-10-CM

## 2024-02-28 DIAGNOSIS — M79.89 SWELLING OF FINGER, LEFT: ICD-10-CM

## 2024-02-28 DIAGNOSIS — Z74.09 DECREASED FUNCTIONAL MOBILITY AND ENDURANCE: ICD-10-CM

## 2024-02-28 PROCEDURE — 97022 WHIRLPOOL THERAPY: CPT | Performed by: OCCUPATIONAL THERAPIST

## 2024-02-28 PROCEDURE — 97110 THERAPEUTIC EXERCISES: CPT | Performed by: OCCUPATIONAL THERAPIST

## 2024-02-28 PROCEDURE — 97140 MANUAL THERAPY 1/> REGIONS: CPT | Performed by: OCCUPATIONAL THERAPIST

## 2024-02-28 NOTE — PROGRESS NOTES
impaired balance, coordination, kinesthetic sense, posture and proprioception  Home exercise program (HEP) development  Modalities prn to address pain, spasms, and swelling: (01826) Vasopneumatic compression    Access Code: [email or web address]  URL: [email or web address]  Date: 02/04/2024  Prepared by: Yanira Tucker DPT    Exercises  - Seated Hamstring Stretch  - 1-2 x daily - 7 x weekly - 1 sets - 5 reps - 12 seconds hold  - Seated Calf Stretch with Strap  - 1-2 x daily - 7 x weekly - 1 sets - 5 reps - 12 seconds hold  - Sitting Heel Slide with Towel  - 1-2 x daily - 7 x weekly - 1 sets - 10 reps - 5 seconds hold  - Active Straight Leg Raise with Quad Set  - 1 x daily - 5 x weekly - 3 sets - 10 reps  - Sidelying Hip Abduction  - 1 x daily - 5 x weekly - 3 sets - 10 reps  - Prone Hip Extension  - 1 x daily - 5 x weekly - 3 sets - 10 reps  - Ice  - 2 x daily - 7 x weekly - 15-20 duration (minutes)      [facility]

## 2024-03-01 ENCOUNTER — TREATMENT (OUTPATIENT)
Age: 54
End: 2024-03-01

## 2024-03-01 DIAGNOSIS — M25.642 STIFFNESS OF LEFT HAND JOINT: ICD-10-CM

## 2024-03-01 DIAGNOSIS — M25.532 WRIST PAIN, LEFT: ICD-10-CM

## 2024-03-01 DIAGNOSIS — M79.89 SWELLING OF FINGER, LEFT: ICD-10-CM

## 2024-03-01 DIAGNOSIS — M25.522 ELBOW PAIN, LEFT: Primary | ICD-10-CM

## 2024-03-04 ENCOUNTER — TREATMENT (OUTPATIENT)
Age: 54
End: 2024-03-04
Payer: MEDICARE

## 2024-03-04 DIAGNOSIS — M25.642 STIFFNESS OF LEFT HAND JOINT: ICD-10-CM

## 2024-03-04 DIAGNOSIS — M25.522 ELBOW PAIN, LEFT: Primary | ICD-10-CM

## 2024-03-04 DIAGNOSIS — M25.532 WRIST PAIN, LEFT: ICD-10-CM

## 2024-03-04 DIAGNOSIS — M79.89 SWELLING OF FINGER, LEFT: ICD-10-CM

## 2024-03-04 PROCEDURE — 97140 MANUAL THERAPY 1/> REGIONS: CPT | Performed by: OCCUPATIONAL THERAPIST

## 2024-03-04 PROCEDURE — 97110 THERAPEUTIC EXERCISES: CPT | Performed by: OCCUPATIONAL THERAPIST

## 2024-03-04 NOTE — PROGRESS NOTES
GVL OT [facility]  [address] [place] [phone]  Dept: [phone]      Occupational Therapy Daily Note     Referring MD: Bobo Landaverde MD    Diagnosis:     ICD-10-CM    1. Elbow pain, left  M25.522       2. Wrist pain, left  M25.532       3. Stiffness of left hand joint  M25.642       4. Swelling of finger, left  M79.89                   Surgery: Left distal radius fracture ORIF and left distal humerus  fracture ORIF  Date 12/19/23     Therapy precautions: Fracture precautions  EPILEPSY    History of injury/onset:  PPt sustained a fall on ice on 12/11/23 in which she sustained a distal humerus and distal radius fracture t has most of [place] [facility], which she elected to have surgery at [facility] for this at the time. Pt sustained a fall on ice on 12/11/23 in which she sustained a distal humerus and distal radius fracture     Direct Treatment Time: 50 min                       Total In Office Time:  55 min  Modifier needed: No  Episode visit count:  20     Preferred Name:  Maryanne PILLAI   Pt states she worked on her exercises all weekend. She states mornings she does feel very stiff.     OBJECTIVE     2/2/24 Functional Outcome Measure:       A/PROM Measures:  A/PROM: AROM : IE  Involved Side    Shoulder screen Mildly limited/tight                                                   2/2/24 2/26/24 2/28/24 3/4/24   Elbow extension/flexion WNL°  -45/94°  -40/89 -20/100 105 -30/114 (end of session)    Supination/Pronation 76/85°  15/52°  55/70 65/  68/70                                                                R                 L                  Opposition (Katie): 10 1 8      Digit ext/flexion screen  8cm to DPC (all digits)  4cm to DPC      Wrist A/PROM: LEFT  2/2/24 LEFT 2/26/24   Wrist Extension/Flexion 28/21°  40/45       Treatment:       Therapeutic exercise (85426) x 25 min:  Contract/relax elbow F/E shoulder flexed to 90 and 4# wrist weight,

## 2024-03-06 ENCOUNTER — TREATMENT (OUTPATIENT)
Age: 54
End: 2024-03-06
Payer: COMMERCIAL

## 2024-03-06 DIAGNOSIS — M25.532 WRIST PAIN, LEFT: ICD-10-CM

## 2024-03-06 DIAGNOSIS — M25.642 STIFFNESS OF LEFT HAND JOINT: ICD-10-CM

## 2024-03-06 DIAGNOSIS — M25.522 ELBOW PAIN, LEFT: Primary | ICD-10-CM

## 2024-03-06 DIAGNOSIS — M79.89 SWELLING OF FINGER, LEFT: ICD-10-CM

## 2024-03-06 PROCEDURE — 97140 MANUAL THERAPY 1/> REGIONS: CPT | Performed by: OCCUPATIONAL THERAPIST

## 2024-03-06 PROCEDURE — 97110 THERAPEUTIC EXERCISES: CPT | Performed by: OCCUPATIONAL THERAPIST

## 2024-03-06 NOTE — PROGRESS NOTES
pronation to improve ability with tasks like writing and placing/acquiring items on tabletop.  Pt will have at least 40 psi of  strength affected  hand to improve ability to grasp and hold an object.  Pt will demonstrate WFL wrist AROM in order to perform all required daily activities.   Pt will be able to lift and carry items (ie grocery bags, laundry basket etc) with affected UE pain 2 of 10 or less.  Pt will be able to sleep through the night with no pain in affected UE.  Pts Quick DASH functional assessment score will be less than 20% functional deficit.     [facility] Portal     OT Protocols

## 2024-03-08 ENCOUNTER — TREATMENT (OUTPATIENT)
Age: 54
End: 2024-03-08
Payer: COMMERCIAL

## 2024-03-08 DIAGNOSIS — M25.522 ELBOW PAIN, LEFT: Primary | ICD-10-CM

## 2024-03-08 DIAGNOSIS — M25.642 STIFFNESS OF LEFT HAND JOINT: ICD-10-CM

## 2024-03-08 DIAGNOSIS — M79.89 SWELLING OF FINGER, LEFT: ICD-10-CM

## 2024-03-08 DIAGNOSIS — M25.532 WRIST PAIN, LEFT: ICD-10-CM

## 2024-03-08 PROCEDURE — 97140 MANUAL THERAPY 1/> REGIONS: CPT | Performed by: OCCUPATIONAL THERAPIST

## 2024-03-08 PROCEDURE — 97110 THERAPEUTIC EXERCISES: CPT | Performed by: OCCUPATIONAL THERAPIST

## 2024-03-08 NOTE — PROGRESS NOTES
GVL OT [facility] ORTHOPAEDICS  [address] [place] [id number]  Dept: [phone]      Occupational Therapy Daily Note     Referring MD: Bboo Landaverde MD    Diagnosis:     ICD-10-CM    1. Elbow pain, left  M25.522       2. Wrist pain, left  M25.532       3. Stiffness of left hand joint  M25.642       4. Swelling of finger, left  M79.89               Surgery: Left distal radius fracture ORIF and left distal humerus  fracture ORIF  Date 12/19/23     Therapy precautions: Fracture precautions  EPILEPSY    History of injury/onset:  PPt sustained a fall on ice on 12/11/23 in which she sustained a distal humerus and distal radius fracture t has most of her medical care at [facility], which she elected to have surgery at [facility] for this at the time. Pt sustained a fall on ice on 12/11/23 in which she sustained a distal humerus and distal radius fracture     Direct Treatment Time: 70 min                       Total In Office Time:  75 min  Modifier needed: No  Episode visit count:  22     Preferred Name:  Maryanne       SUBJECTIVE     Pt reports she is having a hard time doing exercises throughout the day at work.     OBJECTIVE     2/2/24 Functional Outcome Measure:       A/PROM Measures:  A/PROM: AROM : IE  Involved Side    Shoulder screen Mildly limited/tight                                                   2/2/24 2/26/24 2/28/24 3/4/24   Elbow extension/flexion WNL°  -45/94°  -40/89 -20/100 105 -30/114 (end of session)    Supination/Pronation 76/85°  15/52°  55/70 65/  68/70                                                                R                 L                  Opposition (Katie): 10 1 8      Digit ext/flexion screen  8cm to DPC (all digits)  4cm to DPC      Wrist A/PROM: LEFT  2/2/24 LEFT 2/26/24   Wrist Extension/Flexion 28/21°  40/45     Treatment:       Therapeutic exercise (50541) x 30 min:  Contract/relax elbow F/E shoulder flexed to 90 and 2# wrist weight, gravity assisted overhead

## 2024-03-08 NOTE — PROGRESS NOTES
NEW PATIENT INTAKE    Referral Diagnosis: Acute deep vein thrombosis (DVT) of proximal vein of left lower extremity; Menorrhagia with irregular cycle      Referring Provider: Maria Luisa Jones MD    Primary Care Provider: Lombardi, Milena, DO    Family History of Cancer/ Hematology Disorders: No known family history    Presenting Symptoms: DVT: Menorrhagia with irregular cycle    Chronological History of Pertinent Events:     53-year-old black female    PMH: DVT, Epilepsy, MI, Seizures  Followed by Orthopedics, Neurology    1/2/24 - Patient went to ED ([facility])  c/o left knee pain and lower left leg swelling.   Reports recent fall on her left side and left knee, but states she was having knee pain prior to the fall.    1/2/24 - XR KNEE LEFT (3 VIEWS) (EPIC)  IMPRESSION:  Mild degenerative changes of the knee joint with small amount of joint effusions.  No obvious acute bony fracture or joint dislocations.    1/3/24 - Vascular US Duplex Lower Extremity Venous Left (EPIC)  IMPRESSION:  There is evidence of deep venous thrombosis in the left lower extremity with nonocclusive thrombus at the left popliteal vein and occlusive thrombus at the left peroneal vein.    1/24/24 - Patient went to ED ([facility])  c/o heavy vaginal bleeding.   Patient admits to passing large clots.   Patient also admits that she had surgery on her hand last month and lost a lot of blood during the surgery and is now concerned about her hgb levels due to the increased vaginal bleeding.   Patient is diaphoretic, short of breath and tachycardic at 115bpm upon triage.  Abnormal labs (1/24/24 - EPIC)  RBC - 3.28  Hgb - 9.7  Hct - 30.4  RDW - 15.3  Ferritin - 5  Glucose - 112  AST - 10  Abnormal UA levels:  Specific Gravity - 1.032  Blood, Urine - LARGE  Protein, UA - 100  Nitrite, Urine - Positive  Leukocyte Esterase, Urine - MODERATE  Bacteria, UA - 1+    1/24/24 - US PELVIS COMPLETE NON-OB TRANSABDOMINAL AND TRANSVAGINAL (EPIC)  IMPRESSION:  Enlarged

## 2024-03-08 NOTE — PROGRESS NOTES
GVL OT [facility] ORTHOPAEDICS  [address] [place] [id number]  Dept: [phone]      Occupational Therapy Daily Note     Referring MD: Bobo Landaverde MD    Diagnosis:     ICD-10-CM    1. Elbow pain, left  M25.522       2. Wrist pain, left  M25.532       3. Stiffness of left hand joint  M25.642       4. Swelling of finger, left  M79.89                   Surgery: Left distal radius fracture ORIF and left distal humerus  fracture ORIF  Date 12/19/23     Therapy precautions: Fracture precautions  EPILEPSY    History of injury/onset:  PPt sustained a fall on ice on 12/11/23 in which she sustained a distal humerus and distal radius fracture t has most of her medical care at [facility], which she elected to have surgery at [facility] for this at the time. Pt sustained a fall on ice on 12/11/23 in which she sustained a distal humerus and distal radius fracture     Direct Treatment Time: 60 min                       Total In Office Time:  70 min  Modifier needed: No  Episode visit count:  19     Preferred Name:  Maryanne PILLAI   Pt continues to report stiffness   OBJECTIVE     2/2/24 Functional Outcome Measure:       A/PROM Measures:  A/PROM: AROM : IE  Involved Side    Shoulder screen Mildly limited/tight                                                   2/2/24 2/26/24 2/28/24   Elbow extension/flexion WNL°  -45/94°  -40/89 -20/100 105   Supination/Pronation 76/85°  15/52°  55/70 65/                                                                 R                 L                  Opposition (Katie): 10 1 8      Digit ext/flexion screen  8cm to DPC (all digits)  4cm to DPC      Wrist A/PROM: LEFT  2/2/24 LEFT 2/26/24   Wrist Extension/Flexion 28/21°  40/45       Treatment:       Therapeutic exercise (27974) x 25 min:  Contract/relax elbow F/E shoulder flexed to 90 and 2# wrist weight   Extensive stretching to elbow, wrist and hand by therapist    Gentle gravity assisted elbow flexion, with 2

## 2024-03-11 ENCOUNTER — TREATMENT (OUTPATIENT)
Age: 54
End: 2024-03-11
Payer: COMMERCIAL

## 2024-03-11 DIAGNOSIS — M25.522 ELBOW PAIN, LEFT: Primary | ICD-10-CM

## 2024-03-11 DIAGNOSIS — M25.642 STIFFNESS OF LEFT HAND JOINT: ICD-10-CM

## 2024-03-11 DIAGNOSIS — M25.532 WRIST PAIN, LEFT: ICD-10-CM

## 2024-03-11 PROCEDURE — 97110 THERAPEUTIC EXERCISES: CPT | Performed by: OCCUPATIONAL THERAPIST

## 2024-03-11 PROCEDURE — 97140 MANUAL THERAPY 1/> REGIONS: CPT | Performed by: OCCUPATIONAL THERAPIST

## 2024-03-11 NOTE — PROGRESS NOTES
GVL OT [facility] ORTHOPAEDICS  [address] [place] [id number]  Dept: [phone]      Occupational Therapy Daily Note     Referring MD: Bobo Landaverde MD    Diagnosis:     ICD-10-CM    1. Elbow pain, left  M25.522       2. Wrist pain, left  M25.532       3. Stiffness of left hand joint  M25.642                 Surgery: Left distal radius fracture ORIF and left distal humerus  fracture ORIF  Date 12/19/23     Therapy precautions: Fracture precautions  EPILEPSY    History of injury/onset:  PPt sustained a fall on ice on 12/11/23 in which she sustained a distal humerus and distal radius fracture t has most of her medical care at [facility], which she elected to have surgery at [facility] for this at the time. Pt sustained a fall on ice on 12/11/23 in which she sustained a distal humerus and distal radius fracture     Direct Treatment Time: 50 min                       Total In Office Time:  55 min  Modifier needed: No  Episode visit count:  23     Preferred Name:  Maryanne       SUBJECTIVE     Pt reports her hand seems better over all.  Confesses fear in the past, limiting her elbow motion at home.    OBJECTIVE     2/2/24 Functional Outcome Measure:       A/PROM Measures:  A/PROM: AROM : IE  Involved Side    Shoulder screen Mildly limited/tight                                                   2/2/24 2/26/24 2/28/24 3/4/24   Elbow extension/flexion WNL°  -45/94°  -40/89 -20/100 105 -30/114 (end of session)    Supination/Pronation 76/85°  15/52°  55/70 65/  68/70                                                                R                 L                  Opposition (Katie): 10 1 8      Digit ext/flexion screen  8cm to DPC (all digits)  4cm to DPC      Wrist A/PROM: LEFT  2/2/24 LEFT 2/26/24   Wrist Extension/Flexion 28/21°  40/45     Treatment:       Therapeutic exercise (11766) x 25 min:  Contract/relax elbow F/E shoulder flexed to 90 and 2# wrist weight, gravity assisted overhead  stretch

## 2024-03-13 ENCOUNTER — OFFICE VISIT (OUTPATIENT)
Dept: ONCOLOGY | Age: 54
End: 2024-03-13
Payer: COMMERCIAL

## 2024-03-13 ENCOUNTER — TREATMENT (OUTPATIENT)
Age: 54
End: 2024-03-13

## 2024-03-13 VITALS
BODY MASS INDEX: 37.24 KG/M2 | DIASTOLIC BLOOD PRESSURE: 86 MMHG | HEART RATE: 96 BPM | WEIGHT: 266 LBS | SYSTOLIC BLOOD PRESSURE: 132 MMHG | HEIGHT: 71 IN | RESPIRATION RATE: 22 BRPM | TEMPERATURE: 98.9 F | OXYGEN SATURATION: 96 %

## 2024-03-13 DIAGNOSIS — M25.642 STIFFNESS OF LEFT HAND JOINT: ICD-10-CM

## 2024-03-13 DIAGNOSIS — D50.0 IRON DEFICIENCY ANEMIA DUE TO CHRONIC BLOOD LOSS: ICD-10-CM

## 2024-03-13 DIAGNOSIS — M79.89 SWELLING OF FINGER, LEFT: ICD-10-CM

## 2024-03-13 DIAGNOSIS — I82.432 ACUTE DEEP VEIN THROMBOSIS (DVT) OF POPLITEAL VEIN OF LEFT LOWER EXTREMITY (HCC): Primary | ICD-10-CM

## 2024-03-13 DIAGNOSIS — M25.522 ELBOW PAIN, LEFT: Primary | ICD-10-CM

## 2024-03-13 DIAGNOSIS — M25.532 WRIST PAIN, LEFT: ICD-10-CM

## 2024-03-13 PROCEDURE — 99204 OFFICE O/P NEW MOD 45 MIN: CPT | Performed by: INTERNAL MEDICINE

## 2024-03-13 RX ORDER — RIVAROXABAN 20 MG/1
TABLET, FILM COATED ORAL
COMMUNITY
Start: 2024-02-28

## 2024-03-13 ASSESSMENT — PATIENT HEALTH QUESTIONNAIRE - PHQ9
2. FEELING DOWN, DEPRESSED OR HOPELESS: 0
SUM OF ALL RESPONSES TO PHQ QUESTIONS 1-9: 0
SUM OF ALL RESPONSES TO PHQ QUESTIONS 1-9: 0
7. TROUBLE CONCENTRATING ON THINGS, SUCH AS READING THE NEWSPAPER OR WATCHING TELEVISION: 0
5. POOR APPETITE OR OVEREATING: 0
SUM OF ALL RESPONSES TO PHQ9 QUESTIONS 1 & 2: 0
4. FEELING TIRED OR HAVING LITTLE ENERGY: 0
3. TROUBLE FALLING OR STAYING ASLEEP: 0
SUM OF ALL RESPONSES TO PHQ QUESTIONS 1-9: 0
6. FEELING BAD ABOUT YOURSELF - OR THAT YOU ARE A FAILURE OR HAVE LET YOURSELF OR YOUR FAMILY DOWN: 0
1. LITTLE INTEREST OR PLEASURE IN DOING THINGS: 0
SUM OF ALL RESPONSES TO PHQ QUESTIONS 1-9: 0

## 2024-03-13 NOTE — PROGRESS NOTES
[facility]: Office Visit New Patient H & P    Chief Complaint:    Chief Complaint   Patient presents with    New Patient         History of Present Illness:  Ms. Modi is a 53 y.o. female who presents today for evaluation regarding VTE and anemia. She had surgery in December for a left humerus and a left radius fracture, this was due to a slip and fall.  She presented in January to the ED with LLE swelling and pain, diagnosed with DVT in the left popliteal and peroneal veins, she was started on Xarelto.  She then developed worsening menorrhagia, she had already been on iron replacement but her Hgb was down to 9.7, she was started on Provera by the ED and continued by GYN.  She now presents to hematology for recommendations.  She is doing OK, her menorrhagia continues despite the Provera.  No GI symptoms from iron.       Review of Systems:  Constitutional: Negative.   HENT: Negative.   Eyes: Negative.   Respiratory: Negative.   Cardiovascular: Negative.   Gastrointestinal: Negative.   Genitourinary: Negative.   Musculoskeletal: Negative.   Skin: Negative.   Neurological: Negative.   Endo/Heme/Allergies: Negative.   Psychiatric/Behavioral: Negative.   All other systems reviewed and are negative.     Allergies   Allergen Reactions    Tetracycline Anaphylaxis     Patient said when she was little she stopped breathing after taking      Past Medical History:   Diagnosis Date    DVT (deep venous thrombosis) (HCC)     left leg    Epilepsy (HCC)     Heart attack (HCC) 2018    Seizures (HCC)      Past Surgical History:   Procedure Laterality Date    ORTHOPEDIC SURGERY Left     wrist/elbow    OTHER SURGICAL HISTORY  2013    Brain - for seizures (Epilepsy)     Family History   Problem Relation Age of Onset    High Blood Pressure Mother     High Blood Pressure Father      Social History     Socioeconomic History    Marital status: Single     Spouse name: Not on file    Number of children: Not on file

## 2024-03-13 NOTE — PATIENT INSTRUCTIONS
Patient Information from Today's Visit    The standard treatment for blood thinners is about 3 months. At that point we would repeat an ultrasound and some lab work to make sure you don't have any active clots remaining. If these tests come back good, we would stop the blood thinners at that time.    Iron Deficiency discusses today as well. We would either recommend increasing your oral iron daily or IV iron. You can start taking your iron 2-3 times/day and if you decide you want to do the IV Iron just call us and let us know.    Treatment Summary has been discussed and given to patient: N/A  Follow Up:  1 month with doppler ultrasound prior.    Please refer to After Visit Summary or Linkyt for upcoming appointment information. If you have any questions regarding your upcoming schedule please reach out to your care team through Linkyt or call [phone].          -------------------------------------------------------------------------------------------------------------------  Please call our office at [phone] if you have any  of the following symptoms:   Fever of 100.5 or greater  Chills  Shortness of breath  Swelling or pain in one leg    After office hours an answering service is available and will contact a provider for emergencies or if you are experiencing any of the above symptoms.    Patient did express an interest in My Chart.  My Chart log in information explained on the after visit summary printout at the check-out desk.    HERIBERTO NEWSOME RN

## 2024-03-15 ENCOUNTER — TREATMENT (OUTPATIENT)
Age: 54
End: 2024-03-15
Payer: COMMERCIAL

## 2024-03-15 ENCOUNTER — TELEPHONE (OUTPATIENT)
Dept: ONCOLOGY | Age: 54
End: 2024-03-15

## 2024-03-15 DIAGNOSIS — D50.9 IRON DEFICIENCY ANEMIA, UNSPECIFIED IRON DEFICIENCY ANEMIA TYPE: Primary | ICD-10-CM

## 2024-03-15 DIAGNOSIS — M25.532 WRIST PAIN, LEFT: ICD-10-CM

## 2024-03-15 DIAGNOSIS — M25.522 ELBOW PAIN, LEFT: Primary | ICD-10-CM

## 2024-03-15 DIAGNOSIS — M25.642 STIFFNESS OF LEFT HAND JOINT: ICD-10-CM

## 2024-03-15 DIAGNOSIS — M79.89 SWELLING OF FINGER, LEFT: ICD-10-CM

## 2024-03-15 PROCEDURE — 97110 THERAPEUTIC EXERCISES: CPT | Performed by: OCCUPATIONAL THERAPIST

## 2024-03-15 PROCEDURE — 97140 MANUAL THERAPY 1/> REGIONS: CPT | Performed by: OCCUPATIONAL THERAPIST

## 2024-03-15 NOTE — PROGRESS NOTES
GVL OT [facility] ORTHOPAEDICS  [address] [place] [id number]  Dept: [phone]      Occupational Therapy Daily Note     Referring MD: Bobo Landaverde MD    Diagnosis:     ICD-10-CM    1. Elbow pain, left  M25.522       2. Wrist pain, left  M25.532       3. Stiffness of left hand joint  M25.642       4. Swelling of finger, left  M79.89                 Surgery: Left distal radius fracture ORIF and left distal humerus  fracture ORIF  Date 12/19/23     Therapy precautions: Fracture precautions  EPILEPSY    History of injury/onset:  PPt sustained a fall on ice on 12/11/23 in which she sustained a distal humerus and distal radius fracture t [place] [facility] which she elected to have surgery [place] [facility] for this at the time. Pt sustained a fall on ice on 12/11/23 in which she sustained a distal humerus and distal radius fracture     Direct Treatment Time: 50 min                       Total In Office Time:  55 min  Modifier needed: No  Episode visit count:  24     Preferred Name:  Maryanne       SUBJECTIVE     Pt reports her hand seems better over all.  Confesses fear in the past, limiting her elbow motion at home.    OBJECTIVE     2/2/24 Functional Outcome Measure:       A/PROM Measures:  A/PROM: AROM : IE  Involved Side    Shoulder screen Mildly limited/tight                                                   2/2/24 2/26/24 2/28/24 3/4/24   Elbow extension/flexion WNL°  -45/94°  -40/89 -20/100 105 -30/114 (end of session)    Supination/Pronation 76/85°  15/52°  55/70 65/  68/70                                                                R                 L                  Opposition (Katie): 10 1 8      Digit ext/flexion screen  8cm to DPC (all digits)  4cm to DPC      Wrist A/PROM: LEFT  2/2/24 LEFT 2/26/24   Wrist Extension/Flexion 28/21°  40/45     Treatment:       Therapeutic exercise (62794) x 20 min:  Contract/relax elbow F/E shoulder flexed to 90 and 2# wrist

## 2024-03-15 NOTE — PROGRESS NOTES
GVL OT [facility] ORTHOPAEDICS  [address] [place] [id number]  Dept: [phone]      Occupational Therapy Daily Note     Referring MD: Bobo Landaverde MD    Diagnosis:     ICD-10-CM    1. Elbow pain, left  M25.522       2. Wrist pain, left  M25.532       3. Stiffness of left hand joint  M25.642       4. Swelling of finger, left  M79.89           Surgery: Left distal radius fracture ORIF and left distal humerus  fracture ORIF  Date 12/19/23     Therapy precautions: Fracture precautions  EPILEPSY    History of injury/onset:  PPt sustained a fall on ice on 12/11/23 in which she sustained a distal humerus and distal radius fracture t has most of her medical [place] [facility] which she elected to have surgery at [facility] for this at the time. Pt sustained a fall on ice on 12/11/23 in which she sustained a distal humerus and distal radius fracture     Direct Treatment Time: 60 min                       Total In Office Time:  65 min  Modifier needed: No  Episode visit count:  25     Preferred Name:  Maryanne       FREYA   Pt reports she was in a cold room all day at work and has not been able to work on her therapy.     OBJECTIVE     2/2/24 Functional Outcome Measure:       A/PROM Measures:  A/PROM: AROM : IE  Involved Side    Shoulder screen Mildly limited/tight                                                   2/2/24 2/26/24 2/28/24 3/4/24   Elbow extension/flexion WNL°  -45/94°  -40/89 -20/100 105 -30/114 (end of session)    Supination/Pronation 76/85°  15/52°  55/70 65/  68/70                                                                R                 L                  Opposition (Katie): 10 1 8      Digit ext/flexion screen  8cm to DPC (all digits)  4cm to DPC      Wrist A/PROM: LEFT  2/2/24 LEFT 2/26/24   Wrist Extension/Flexion 28/21°  40/45     Treatment:       Therapeutic exercise (49686) x 20 min:  Contract/relax elbow F/E shoulder flexed to 90 and 2# wrist weight, gravity assisted

## 2024-03-15 NOTE — TELEPHONE ENCOUNTER
Physician provider: Bobo Del Cid MD  Reason for today's call: ORDERS  Last office visit:3/13/2024    Patient notified that their information will be routed to the [facility] clinical triage team for review. Patient is advised that they will receive a phone call from the triage department.       PT stated in her New patient appt on 3/13 she discussed with  she would give our office a call back if she was interested in Iron infusion.     PT is requesting a order to be put in for  iron infusions if possible.

## 2024-03-15 NOTE — TELEPHONE ENCOUNTER
Patient willing to do iron infusions now. Will send to infusion scheduling to arrange. Advised patient that if insurance requires us to do a different iron preparation we will let her know as appts might need to be rearranged.

## 2024-03-22 ENCOUNTER — HOSPITAL ENCOUNTER (OUTPATIENT)
Dept: INFUSION THERAPY | Age: 54
Setting detail: INFUSION SERIES
Discharge: HOME | End: 2024-03-22
Payer: COMMERCIAL

## 2024-03-22 VITALS
SYSTOLIC BLOOD PRESSURE: 127 MMHG | DIASTOLIC BLOOD PRESSURE: 75 MMHG | OXYGEN SATURATION: 98 % | HEART RATE: 88 BPM | TEMPERATURE: 98.9 F | RESPIRATION RATE: 18 BRPM

## 2024-03-22 DIAGNOSIS — D50.9 IRON DEFICIENCY ANEMIA, UNSPECIFIED IRON DEFICIENCY ANEMIA TYPE: Primary | ICD-10-CM

## 2024-03-22 PROCEDURE — 96365 THER/PROPH/DIAG IV INF INIT: CPT

## 2024-03-22 PROCEDURE — 2580000003 HC RX 258: Performed by: INTERNAL MEDICINE

## 2024-03-22 PROCEDURE — 6360000002 HC RX W HCPCS: Performed by: INTERNAL MEDICINE

## 2024-03-22 RX ORDER — ALBUTEROL SULFATE 90 UG/1
4 AEROSOL, METERED RESPIRATORY (INHALATION) PRN
Status: CANCELLED | OUTPATIENT
Start: 2024-03-29

## 2024-03-22 RX ORDER — SODIUM CHLORIDE 9 MG/ML
5-250 INJECTION, SOLUTION INTRAVENOUS PRN
Status: DISCONTINUED | OUTPATIENT
Start: 2024-03-22 | End: 2024-03-23 | Stop reason: HOSPADM

## 2024-03-22 RX ORDER — HEPARIN 100 UNIT/ML
500 SYRINGE INTRAVENOUS PRN
Status: DISCONTINUED | OUTPATIENT
Start: 2024-03-22 | End: 2024-03-23 | Stop reason: HOSPADM

## 2024-03-22 RX ORDER — DIPHENHYDRAMINE HYDROCHLORIDE 50 MG/ML
50 INJECTION INTRAMUSCULAR; INTRAVENOUS
Status: DISCONTINUED | OUTPATIENT
Start: 2024-03-22 | End: 2024-03-23 | Stop reason: HOSPADM

## 2024-03-22 RX ORDER — SODIUM CHLORIDE 9 MG/ML
INJECTION, SOLUTION INTRAVENOUS CONTINUOUS
Status: DISCONTINUED | OUTPATIENT
Start: 2024-03-22 | End: 2024-03-23 | Stop reason: HOSPADM

## 2024-03-22 RX ORDER — DIPHENHYDRAMINE HYDROCHLORIDE 50 MG/ML
50 INJECTION INTRAMUSCULAR; INTRAVENOUS
Status: CANCELLED | OUTPATIENT
Start: 2024-03-29

## 2024-03-22 RX ORDER — SODIUM CHLORIDE 9 MG/ML
INJECTION, SOLUTION INTRAVENOUS CONTINUOUS
Status: CANCELLED | OUTPATIENT
Start: 2024-03-29

## 2024-03-22 RX ORDER — ACETAMINOPHEN 325 MG/1
650 TABLET ORAL
Status: CANCELLED | OUTPATIENT
Start: 2024-03-29

## 2024-03-22 RX ORDER — SODIUM CHLORIDE 0.9 % (FLUSH) 0.9 %
5-40 SYRINGE (ML) INJECTION PRN
Status: CANCELLED | OUTPATIENT
Start: 2024-03-29

## 2024-03-22 RX ORDER — EPINEPHRINE 1 MG/ML
0.3 INJECTION, SOLUTION, CONCENTRATE INTRAVENOUS PRN
Status: CANCELLED | OUTPATIENT
Start: 2024-03-29

## 2024-03-22 RX ORDER — ACETAMINOPHEN 325 MG/1
650 TABLET ORAL
Status: DISCONTINUED | OUTPATIENT
Start: 2024-03-22 | End: 2024-03-23 | Stop reason: HOSPADM

## 2024-03-22 RX ORDER — SODIUM CHLORIDE 0.9 % (FLUSH) 0.9 %
5-40 SYRINGE (ML) INJECTION PRN
Status: DISCONTINUED | OUTPATIENT
Start: 2024-03-22 | End: 2024-03-23 | Stop reason: HOSPADM

## 2024-03-22 RX ORDER — HEPARIN 100 UNIT/ML
500 SYRINGE INTRAVENOUS PRN
Status: CANCELLED | OUTPATIENT
Start: 2024-03-29

## 2024-03-22 RX ORDER — ONDANSETRON 2 MG/ML
8 INJECTION INTRAMUSCULAR; INTRAVENOUS
Status: DISCONTINUED | OUTPATIENT
Start: 2024-03-22 | End: 2024-03-23 | Stop reason: HOSPADM

## 2024-03-22 RX ORDER — ONDANSETRON 2 MG/ML
8 INJECTION INTRAMUSCULAR; INTRAVENOUS
Status: CANCELLED | OUTPATIENT
Start: 2024-03-29

## 2024-03-22 RX ORDER — SODIUM CHLORIDE 9 MG/ML
5-250 INJECTION, SOLUTION INTRAVENOUS PRN
Status: CANCELLED | OUTPATIENT
Start: 2024-03-29

## 2024-03-22 RX ORDER — EPINEPHRINE 1 MG/ML
0.3 INJECTION, SOLUTION, CONCENTRATE INTRAVENOUS PRN
Status: DISCONTINUED | OUTPATIENT
Start: 2024-03-22 | End: 2024-03-23 | Stop reason: HOSPADM

## 2024-03-22 RX ORDER — ALBUTEROL SULFATE 90 UG/1
4 AEROSOL, METERED RESPIRATORY (INHALATION) PRN
Status: DISCONTINUED | OUTPATIENT
Start: 2024-03-22 | End: 2024-03-23 | Stop reason: HOSPADM

## 2024-03-22 RX ADMIN — SODIUM CHLORIDE, PRESERVATIVE FREE 10 ML: 5 INJECTION INTRAVENOUS at 15:45

## 2024-03-22 RX ADMIN — SODIUM CHLORIDE 100 ML/HR: 9 INJECTION, SOLUTION INTRAVENOUS at 15:49

## 2024-03-22 RX ADMIN — FERUMOXYTOL 510 MG: 510 INJECTION INTRAVENOUS at 16:11

## 2024-03-22 NOTE — PROGRESS NOTES
Arrived to the Infusion Center.  Feraheme completed. Patient tolerated without difficulty.   Any issues or concerns during appointment: None.  Patient aware of next infusion appointment on 03/30 (date) at 1315 (time).  Patient instructed to call provider with temperature of 100.4 or greater or nausea/vomiting/ diarrhea or pain not controlled by medications  Discharged ambulatory.

## 2024-03-27 ENCOUNTER — OFFICE VISIT (OUTPATIENT)
Dept: ORTHOPEDIC SURGERY | Age: 54
End: 2024-03-27
Payer: COMMERCIAL

## 2024-03-27 DIAGNOSIS — M17.12 OSTEOARTHRITIS OF LEFT KNEE, UNSPECIFIED OSTEOARTHRITIS TYPE: ICD-10-CM

## 2024-03-27 DIAGNOSIS — M25.562 LEFT KNEE PAIN, UNSPECIFIED CHRONICITY: Primary | ICD-10-CM

## 2024-03-27 PROCEDURE — 99203 OFFICE O/P NEW LOW 30 MIN: CPT | Performed by: PHYSICIAN ASSISTANT

## 2024-03-27 NOTE — PROGRESS NOTES
Heart attack (HCC) 2018    Seizures (HCC)      .[facility]  Past Surgical History:   Procedure Laterality Date    ORTHOPEDIC SURGERY Left     wrist/elbow    OTHER SURGICAL HISTORY  2013    Brain - for seizures (Epilepsy)     Family History   Problem Relation Age of Onset    High Blood Pressure Mother     High Blood Pressure Father      Social History     Socioeconomic History    Marital status: Single     Spouse name: Not on file    Number of children: Not on file    Years of education: Not on file    Highest education level: Not on file   Occupational History    Not on file   Tobacco Use    Smoking status: Never     Passive exposure: Past    Smokeless tobacco: Never   Vaping Use    Vaping Use: Never used   Substance and Sexual Activity    Alcohol use: Never    Drug use: Never    Sexual activity: Not Currently     Birth control/protection: None   Other Topics Concern    Not on file   Social History Narrative    ** Merged History Encounter **          Social Determinants of Health     Financial Resource Strain: Not on file   Food Insecurity: Not on file   Transportation Needs: Not on file   Physical Activity: Not on file   Stress: Not on file   Social Connections: Not on file   Intimate Partner Violence: Not on file   Housing Stability: Not on file       Review of Systems:  As per HPI.  Pertinent positives and negatives are addressed with the patient, particularly those related to musculoskeletal concerns.   Non-orthopaedic concerns were referred back to the primary care physician.    PHYSICAL EXAMINATION:   The patient appears their stated age, obese, and they are in no distress.  The lower extremities are as described below.  Circulation is normal with palpable pedal pulses bilaterally and no edema.  There is no lymph adenopathy in the popliteal or malleolar region.  The skin is without stasis disease distally bilaterally.  Hip ROM is smooth and painless.  Knee range of motion is 0-120 degrees on the right and 0-120

## 2024-03-30 ENCOUNTER — HOSPITAL ENCOUNTER (OUTPATIENT)
Dept: INFUSION THERAPY | Age: 54
Setting detail: INFUSION SERIES
Discharge: HOME | End: 2024-03-30
Payer: COMMERCIAL

## 2024-03-30 VITALS
SYSTOLIC BLOOD PRESSURE: 143 MMHG | DIASTOLIC BLOOD PRESSURE: 73 MMHG | OXYGEN SATURATION: 99 % | BODY MASS INDEX: 38.25 KG/M2 | WEIGHT: 270.4 LBS | TEMPERATURE: 98.3 F | RESPIRATION RATE: 18 BRPM | HEART RATE: 76 BPM

## 2024-03-30 DIAGNOSIS — D50.9 IRON DEFICIENCY ANEMIA, UNSPECIFIED IRON DEFICIENCY ANEMIA TYPE: Primary | ICD-10-CM

## 2024-03-30 PROCEDURE — 2580000003 HC RX 258: Performed by: INTERNAL MEDICINE

## 2024-03-30 PROCEDURE — 96365 THER/PROPH/DIAG IV INF INIT: CPT

## 2024-03-30 PROCEDURE — 6360000002 HC RX W HCPCS: Performed by: INTERNAL MEDICINE

## 2024-03-30 RX ORDER — ONDANSETRON 2 MG/ML
8 INJECTION INTRAMUSCULAR; INTRAVENOUS
Status: DISCONTINUED | OUTPATIENT
Start: 2024-03-30 | End: 2024-03-31 | Stop reason: HOSPADM

## 2024-03-30 RX ORDER — SODIUM CHLORIDE 9 MG/ML
5-250 INJECTION, SOLUTION INTRAVENOUS PRN
Status: DISCONTINUED | OUTPATIENT
Start: 2024-03-30 | End: 2024-03-31 | Stop reason: HOSPADM

## 2024-03-30 RX ORDER — SODIUM CHLORIDE 0.9 % (FLUSH) 0.9 %
5-40 SYRINGE (ML) INJECTION PRN
OUTPATIENT
Start: 2024-04-05

## 2024-03-30 RX ORDER — ALBUTEROL SULFATE 90 UG/1
4 AEROSOL, METERED RESPIRATORY (INHALATION) PRN
Status: DISCONTINUED | OUTPATIENT
Start: 2024-03-30 | End: 2024-03-31 | Stop reason: HOSPADM

## 2024-03-30 RX ORDER — SODIUM CHLORIDE 9 MG/ML
5-250 INJECTION, SOLUTION INTRAVENOUS PRN
OUTPATIENT
Start: 2024-04-05

## 2024-03-30 RX ORDER — ONDANSETRON 2 MG/ML
8 INJECTION INTRAMUSCULAR; INTRAVENOUS
OUTPATIENT
Start: 2024-04-05

## 2024-03-30 RX ORDER — ACETAMINOPHEN 325 MG/1
650 TABLET ORAL
Status: DISCONTINUED | OUTPATIENT
Start: 2024-03-30 | End: 2024-03-31 | Stop reason: HOSPADM

## 2024-03-30 RX ORDER — ALBUTEROL SULFATE 90 UG/1
4 AEROSOL, METERED RESPIRATORY (INHALATION) PRN
OUTPATIENT
Start: 2024-04-05

## 2024-03-30 RX ORDER — EPINEPHRINE 1 MG/ML
0.3 INJECTION, SOLUTION, CONCENTRATE INTRAVENOUS PRN
Status: DISCONTINUED | OUTPATIENT
Start: 2024-03-30 | End: 2024-03-31 | Stop reason: HOSPADM

## 2024-03-30 RX ORDER — EPINEPHRINE 1 MG/ML
0.3 INJECTION, SOLUTION, CONCENTRATE INTRAVENOUS PRN
OUTPATIENT
Start: 2024-04-05

## 2024-03-30 RX ORDER — DIPHENHYDRAMINE HYDROCHLORIDE 50 MG/ML
50 INJECTION INTRAMUSCULAR; INTRAVENOUS
Status: DISCONTINUED | OUTPATIENT
Start: 2024-03-30 | End: 2024-03-31 | Stop reason: HOSPADM

## 2024-03-30 RX ORDER — SODIUM CHLORIDE 9 MG/ML
INJECTION, SOLUTION INTRAVENOUS CONTINUOUS
OUTPATIENT
Start: 2024-04-05

## 2024-03-30 RX ORDER — DIPHENHYDRAMINE HYDROCHLORIDE 50 MG/ML
50 INJECTION INTRAMUSCULAR; INTRAVENOUS
OUTPATIENT
Start: 2024-04-05

## 2024-03-30 RX ORDER — HEPARIN 100 UNIT/ML
500 SYRINGE INTRAVENOUS PRN
OUTPATIENT
Start: 2024-04-05

## 2024-03-30 RX ORDER — ACETAMINOPHEN 325 MG/1
650 TABLET ORAL
OUTPATIENT
Start: 2024-04-05

## 2024-03-30 RX ORDER — SODIUM CHLORIDE 9 MG/ML
5-250 INJECTION, SOLUTION INTRAVENOUS PRN
Status: CANCELLED | OUTPATIENT
Start: 2024-04-05

## 2024-03-30 RX ORDER — SODIUM CHLORIDE 9 MG/ML
INJECTION, SOLUTION INTRAVENOUS CONTINUOUS
Status: DISCONTINUED | OUTPATIENT
Start: 2024-03-30 | End: 2024-03-31 | Stop reason: HOSPADM

## 2024-03-30 RX ADMIN — SODIUM CHLORIDE 75 ML/HR: 9 INJECTION, SOLUTION INTRAVENOUS at 13:39

## 2024-03-30 RX ADMIN — FERUMOXYTOL 510 MG: 510 INJECTION INTRAVENOUS at 13:53

## 2024-03-30 NOTE — PROGRESS NOTES
Arrived to the Infusion Center.  Iron completed. Patient tolerated well. Observed patient x 30 min. No reactions.  Any issues or concerns during appointment: None.  Patient aware no future infusion appointments.  Patient aware of next lab and [facility] office visit on 4/16 (date) at 0710 (time).  Patient instructed to call provider with temperature of 100.4 or greater or nausea/vomiting/ diarrhea or pain not controlled by medications  Discharged ambulatory in stable condition..

## 2024-03-30 NOTE — PLAN OF CARE
Problem: Chronic Conditions and Co-morbidities  Goal: Patient's chronic conditions and co-morbidity symptoms are monitored and maintained or improved  3/30/2024 1511 by Corry Aguero, RN  Outcome: Progressing  3/30/2024 1334 by Corry Aguero, RN  Outcome: Progressing

## 2024-04-02 ENCOUNTER — TELEPHONE (OUTPATIENT)
Dept: OBGYN CLINIC | Age: 54
End: 2024-04-02

## 2024-04-02 NOTE — TELEPHONE ENCOUNTER
Pt LVM requesting to be seen at a different time on 04/10/24, as she has an ortho appt she cannot miss at 2:30 pm. Her appt here is at 2:15. Msg forwarded to team SDJ, as her schedule is full that day.

## 2024-04-08 NOTE — PROGRESS NOTES
Name: Maryanne Modi  Date: 4/10/2024  YOB: 1970  LMP: Patient's last menstrual period was 2024 (approximate).      Maryanne is a 53 y.o.   who is here for a Severe Menorrhagia on anticoagulants for recent DVT, large fibroids on US.  She has been off the anticoags for 1 week as it has been 3 months.  She is seeing ortho today. She has an appt with hematology next week.  The anticipation is that she will stay off of anticoags.  She would like to avoid a hyst obviously.     irth control:  never been active  Menstrual status: irregular cycles, spotting some  Gyn Surgery: none     Health Maintenance:  Pap Smear: last pap in  never  If 40 or older, Mammo: last mammo in 10/31/23, pathology Negative   If 45 or older, Colonoscopy:  never  If postmenopausal, Dexa scan: not needed    Review of Systems   Constitutional: Negative.    HENT: Negative.     Eyes: Negative.    Respiratory: Negative.     Cardiovascular: Negative.    Gastrointestinal: Negative.    Endocrine: Negative.    Genitourinary: Negative.    Musculoskeletal: Negative.    Skin: Negative.    Allergic/Immunologic: Negative.    Hematological: Negative.    Psychiatric/Behavioral: Negative.  Negative for self-injury and suicidal ideas.         Reports improvement in bleeding. Now just having some random spotting. Is no longer on anticoagulants X 1 wk.     Past Medical History:  No date: DVT (deep venous thrombosis) ([facility])      Comment:  left leg  No date: Epilepsy (HCC)  2018: Heart attack (HCC)  No date: Seizures ([facility])     Past Surgical History:  No date: ORTHOPEDIC SURGERY; Left      Comment:  wrist/elbow  2013: OTHER SURGICAL HISTORY      Comment:  Brain - for seizures (Epilepsy)       Current Outpatient Medications:     Multiple Vitamins-Minerals (MULTIVITAL PO), Take by mouth, Disp: , Rfl:     Ferrous Gluconate (IRON 27 PO), Take by mouth, Disp: , Rfl:     Cholecalciferol (VITAMIN D3) 50 MCG ( [place]) CAPS, Take by mouth, Disp: ,

## 2024-04-09 ENCOUNTER — HOSPITAL ENCOUNTER (OUTPATIENT)
Dept: ULTRASOUND IMAGING | Age: 54
Discharge: HOME | End: 2024-04-12
Attending: INTERNAL MEDICINE
Payer: COMMERCIAL

## 2024-04-09 DIAGNOSIS — I82.432 ACUTE DEEP VEIN THROMBOSIS (DVT) OF POPLITEAL VEIN OF LEFT LOWER EXTREMITY (HCC): ICD-10-CM

## 2024-04-09 PROCEDURE — 93971 EXTREMITY STUDY: CPT

## 2024-04-09 PROCEDURE — 93971 EXTREMITY STUDY: CPT | Performed by: RADIOLOGY

## 2024-04-10 ENCOUNTER — OFFICE VISIT (OUTPATIENT)
Dept: ORTHOPEDIC SURGERY | Age: 54
End: 2024-04-10
Payer: COMMERCIAL

## 2024-04-10 ENCOUNTER — OFFICE VISIT (OUTPATIENT)
Dept: OBGYN CLINIC | Age: 54
End: 2024-04-10
Payer: COMMERCIAL

## 2024-04-10 VITALS
BODY MASS INDEX: 37.42 KG/M2 | DIASTOLIC BLOOD PRESSURE: 76 MMHG | SYSTOLIC BLOOD PRESSURE: 112 MMHG | WEIGHT: 264.55 LBS

## 2024-04-10 DIAGNOSIS — M17.12 OSTEOARTHRITIS OF LEFT KNEE, UNSPECIFIED OSTEOARTHRITIS TYPE: Primary | ICD-10-CM

## 2024-04-10 DIAGNOSIS — S83.282D OTHER TEAR OF LATERAL MENISCUS OF LEFT KNEE, UNSPECIFIED WHETHER OLD OR CURRENT TEAR, SUBSEQUENT ENCOUNTER: ICD-10-CM

## 2024-04-10 DIAGNOSIS — N92.1 MENORRHAGIA WITH IRREGULAR CYCLE: Primary | ICD-10-CM

## 2024-04-10 DIAGNOSIS — Z79.01 ANTICOAGULATED: ICD-10-CM

## 2024-04-10 PROCEDURE — 99213 OFFICE O/P EST LOW 20 MIN: CPT | Performed by: PHYSICIAN ASSISTANT

## 2024-04-10 PROCEDURE — 99213 OFFICE O/P EST LOW 20 MIN: CPT | Performed by: OBSTETRICS & GYNECOLOGY

## 2024-04-10 SDOH — ECONOMIC STABILITY: FOOD INSECURITY: WITHIN THE PAST 12 MONTHS, YOU WORRIED THAT YOUR FOOD WOULD RUN OUT BEFORE YOU GOT MONEY TO BUY MORE.: NEVER TRUE

## 2024-04-10 SDOH — ECONOMIC STABILITY: HOUSING INSECURITY
IN THE LAST 12 MONTHS, WAS THERE A TIME WHEN YOU DID NOT HAVE A STEADY PLACE TO SLEEP OR SLEPT IN A SHELTER (INCLUDING NOW)?: NO

## 2024-04-10 SDOH — ECONOMIC STABILITY: INCOME INSECURITY: HOW HARD IS IT FOR YOU TO PAY FOR THE VERY BASICS LIKE FOOD, HOUSING, MEDICAL CARE, AND HEATING?: NOT HARD AT ALL

## 2024-04-10 SDOH — ECONOMIC STABILITY: FOOD INSECURITY: WITHIN THE PAST 12 MONTHS, THE FOOD YOU BOUGHT JUST DIDN'T LAST AND YOU DIDN'T HAVE MONEY TO GET MORE.: NEVER TRUE

## 2024-04-10 ASSESSMENT — ENCOUNTER SYMPTOMS
GASTROINTESTINAL NEGATIVE: 1
ALLERGIC/IMMUNOLOGIC NEGATIVE: 1
RESPIRATORY NEGATIVE: 1
EYES NEGATIVE: 1

## 2024-04-10 NOTE — PROGRESS NOTES
CC: Follow-up on left knee pain and MRI review    HPI: Patient returns today for follow-up on left knee pain which she reports is currently mild in severity not functionally limiting her to any great degree.  She reports left knee pain got worse after she sustained a fall in December with resulting left distal radius fracture requiring ORIF.  She had MRI of the left knee on 4/6/2024 which revealed a complex tear of the lateral meniscus with a horizontal component in the body extending to the tibial surface with extrusion.  Thickening and edema throughout the ACL with a wavy anterior margin consistent with a grade 2-3 sprain present.  Severe chondromalacia in the lateral compartment and moderate chondromalacia in the medial and lateral patellar facet were noted.  A 3.1 x 1.2 x 1.8 cm Baker's cyst and moderate knee joint effusion was noted as well, per radiologist impression.  Patient reports mild intermittent left knee swelling with increased activity.  Denies any appreciable left knee instability.  No other new complaints.    ROS: As per HPI; otherwise, 10 point review of systems is negative.    PM SH: Reviewed and unchanged.    PE: Patient is alert and oriented, no acute distress.  Ambulates with fairly normal reciprocal gait.  Semination of left knee reveals overlying skin intact and in good condition.  There is trace left knee joint effusion present.  Patient demonstrates relatively smooth and painless left knee joint motion to 0 degrees of extension and 120 degrees of flexion.  Left knee joint feels grossly stable in all directions.  No appreciable laxity noted with anterior drawer or Lachman's.  There is mild lateral joint line tenderness present.  Light touch sensation motor function intact throughout.  Vascular intact.    Assessment: Left knee osteoarthritis, left knee lateral meniscus tear.    Plan: Reviewed MRI findings with the patient today.  As noted, she reports that left knee symptoms are currently

## 2024-04-13 ENCOUNTER — PATIENT MESSAGE (OUTPATIENT)
Dept: OBGYN CLINIC | Age: 54
End: 2024-04-13

## 2024-04-16 ENCOUNTER — OFFICE VISIT (OUTPATIENT)
Dept: ONCOLOGY | Age: 54
End: 2024-04-16
Payer: COMMERCIAL

## 2024-04-16 ENCOUNTER — HOSPITAL ENCOUNTER (OUTPATIENT)
Dept: LAB | Age: 54
Discharge: HOME | End: 2024-04-19
Payer: COMMERCIAL

## 2024-04-16 VITALS
DIASTOLIC BLOOD PRESSURE: 86 MMHG | OXYGEN SATURATION: 96 % | RESPIRATION RATE: 28 BRPM | WEIGHT: 267.8 LBS | HEART RATE: 71 BPM | HEIGHT: 71 IN | TEMPERATURE: 98.4 F | SYSTOLIC BLOOD PRESSURE: 126 MMHG | BODY MASS INDEX: 37.49 KG/M2

## 2024-04-16 DIAGNOSIS — D50.0 IRON DEFICIENCY ANEMIA DUE TO CHRONIC BLOOD LOSS: Primary | ICD-10-CM

## 2024-04-16 DIAGNOSIS — I82.432 ACUTE DEEP VEIN THROMBOSIS (DVT) OF POPLITEAL VEIN OF LEFT LOWER EXTREMITY (HCC): ICD-10-CM

## 2024-04-16 DIAGNOSIS — Z86.718 HISTORY OF DEEP VEIN THROMBOSIS OF LOWER EXTREMITY: ICD-10-CM

## 2024-04-16 DIAGNOSIS — D50.0 IRON DEFICIENCY ANEMIA DUE TO CHRONIC BLOOD LOSS: ICD-10-CM

## 2024-04-16 LAB
ALBUMIN SERPL-MCNC: 3.4 G/DL (ref 3.5–5)
ALBUMIN/GLOB SERPL: 0.9 (ref 0.4–1.6)
ALP SERPL-CCNC: 69 U/L (ref 50–136)
ALT SERPL-CCNC: 16 U/L (ref 12–65)
ANION GAP SERPL CALC-SCNC: 6 MMOL/L (ref 2–11)
AST SERPL-CCNC: 15 U/L (ref 15–37)
BASOPHILS # BLD: 0 K/UL (ref 0–0.2)
BASOPHILS NFR BLD: 1 % (ref 0–2)
BILIRUB SERPL-MCNC: 0.4 MG/DL (ref 0.2–1.1)
BUN SERPL-MCNC: 10 MG/DL (ref 6–23)
CALCIUM SERPL-MCNC: 9 MG/DL (ref 8.3–10.4)
CHLORIDE SERPL-SCNC: 110 MMOL/L (ref 103–113)
CO2 SERPL-SCNC: 24 MMOL/L (ref 21–32)
CREAT SERPL-MCNC: 0.9 MG/DL (ref 0.6–1)
D DIMER PPP FEU-MCNC: 1.07 UG/ML(FEU)
DIFFERENTIAL METHOD BLD: ABNORMAL
EOSINOPHIL # BLD: 0.1 K/UL (ref 0–0.8)
EOSINOPHIL NFR BLD: 2 % (ref 0.5–7.8)
ERYTHROCYTE [DISTWIDTH] IN BLOOD BY AUTOMATED COUNT: 14.6 % (ref 11.9–14.6)
FERRITIN SERPL-MCNC: 25 NG/ML (ref 8–388)
GLOBULIN SER CALC-MCNC: 3.9 G/DL (ref 2.8–4.5)
GLUCOSE SERPL-MCNC: 86 MG/DL (ref 65–100)
HCT VFR BLD AUTO: 35.8 % (ref 35.8–46.3)
HGB BLD-MCNC: 11.8 G/DL (ref 11.7–15.4)
IMM GRANULOCYTES # BLD AUTO: 0 K/UL (ref 0–0.5)
IMM GRANULOCYTES NFR BLD AUTO: 0 % (ref 0–5)
IRON SATN MFR SERPL: 21 %
IRON SERPL-MCNC: 52 UG/DL (ref 35–150)
LYMPHOCYTES # BLD: 1.3 K/UL (ref 0.5–4.6)
LYMPHOCYTES NFR BLD: 21 % (ref 13–44)
MCH RBC QN AUTO: 29.5 PG (ref 26.1–32.9)
MCHC RBC AUTO-ENTMCNC: 33 G/DL (ref 31.4–35)
MCV RBC AUTO: 89.5 FL (ref 82–102)
MONOCYTES # BLD: 0.5 K/UL (ref 0.1–1.3)
MONOCYTES NFR BLD: 8 % (ref 4–12)
NEUTS SEG # BLD: 4.3 K/UL (ref 1.7–8.2)
NEUTS SEG NFR BLD: 68 % (ref 43–78)
NRBC # BLD: 0 K/UL (ref 0–0.2)
PLATELET # BLD AUTO: 270 K/UL (ref 150–450)
PMV BLD AUTO: 9.9 FL (ref 9.4–12.3)
POTASSIUM SERPL-SCNC: 3.8 MMOL/L (ref 3.5–5.1)
PROT SERPL-MCNC: 7.3 G/DL (ref 6.3–8.2)
RBC # BLD AUTO: 4 M/UL (ref 4.05–5.2)
SODIUM SERPL-SCNC: 140 MMOL/L (ref 136–146)
TIBC SERPL-MCNC: 251 UG/DL (ref 250–450)
WBC # BLD AUTO: 6.2 K/UL (ref 4.3–11.1)

## 2024-04-16 PROCEDURE — 83550 IRON BINDING TEST: CPT

## 2024-04-16 PROCEDURE — 85379 FIBRIN DEGRADATION QUANT: CPT

## 2024-04-16 PROCEDURE — 83540 ASSAY OF IRON: CPT

## 2024-04-16 PROCEDURE — 36415 COLL VENOUS BLD VENIPUNCTURE: CPT

## 2024-04-16 PROCEDURE — 82728 ASSAY OF FERRITIN: CPT

## 2024-04-16 PROCEDURE — 85025 COMPLETE CBC W/AUTO DIFF WBC: CPT

## 2024-04-16 PROCEDURE — 80053 COMPREHEN METABOLIC PANEL: CPT

## 2024-04-16 PROCEDURE — 99214 OFFICE O/P EST MOD 30 MIN: CPT | Performed by: NURSE PRACTITIONER

## 2024-04-16 ASSESSMENT — PATIENT HEALTH QUESTIONNAIRE - PHQ9
1. LITTLE INTEREST OR PLEASURE IN DOING THINGS: NOT AT ALL
SUM OF ALL RESPONSES TO PHQ9 QUESTIONS 1 & 2: 0
SUM OF ALL RESPONSES TO PHQ QUESTIONS 1-9: 0
SUM OF ALL RESPONSES TO PHQ QUESTIONS 1-9: 0
2. FEELING DOWN, DEPRESSED OR HOPELESS: NOT AT ALL
SUM OF ALL RESPONSES TO PHQ QUESTIONS 1-9: 0
SUM OF ALL RESPONSES TO PHQ QUESTIONS 1-9: 0

## 2024-04-16 NOTE — PROGRESS NOTES
[facility]: Office Visit Established Patient H & P    Chief Complaint:    Chief Complaint   Patient presents with    Follow-up         History of Present Illness:  Ms. Modi is a 53 y.o. female who presents today for evaluation regarding VTE and anemia. She had surgery in December for a left humerus and a left radius fracture, this was due to a slip and fall.  She presented in January to the ED with LLE swelling and pain, diagnosed with DVT in the left popliteal and peroneal veins, she was started on Xarelto.  She then developed worsening menorrhagia, she had already been on iron replacement but her Hgb was down to 9.7, she was started on Provera by the ED and continued by GYN. Since she had a reversible risk factor and a distal DVT, I am comfortable with 3 months of DOAC (April 2024) assuming ultrasound and D-dimer are negative at that time.  She remains anemic despite iron replacement, this is due to menorrhagia caused by fibroids and exacerbated by AC.  I would increase her iron to BID (TID if possible), offered IV iron but she will think about this.     She returns today for follow up for DVT and BRIE.  Since last seen she agreed to IV iron and received Feraheme x 2 in March and tolerated it well.  Last week she completed her 3 months of Xarelto.  Menorrhagia is ongoing.  Fatigue has improved and LLE pain discomfort has also improved.  She is taking oral iron once daily with vit C with mild constipation that is manageable.     Review of Systems:  Constitutional: Negative.   HENT: Negative.   Eyes: Negative.   Respiratory: Negative.   Cardiovascular: Negative.   Gastrointestinal: Negative.   Genitourinary: Negative.   Musculoskeletal: Negative.   Skin: Negative.   Neurological: Negative.   Endo/Heme/Allergies: Negative.   Psychiatric/Behavioral: Negative.   All other systems reviewed and are negative.     Allergies   Allergen Reactions    Tetracycline Anaphylaxis     Patient said when she

## 2024-05-06 RX ORDER — MEDROXYPROGESTERONE ACETATE 10 MG/1
10 TABLET ORAL DAILY
Qty: 90 TABLET | Refills: 1 | Status: SHIPPED | OUTPATIENT
Start: 2024-05-06

## 2024-05-13 ENCOUNTER — OFFICE VISIT (OUTPATIENT)
Dept: PRIMARY CARE CLINIC | Facility: CLINIC | Age: 54
End: 2024-05-13
Payer: COMMERCIAL

## 2024-05-13 VITALS
HEART RATE: 87 BPM | BODY MASS INDEX: 39.08 KG/M2 | WEIGHT: 273 LBS | DIASTOLIC BLOOD PRESSURE: 82 MMHG | OXYGEN SATURATION: 97 % | HEIGHT: 70 IN | SYSTOLIC BLOOD PRESSURE: 128 MMHG

## 2024-05-13 DIAGNOSIS — I25.2 HISTORY OF ACUTE MYOCARDIAL INFARCTION: ICD-10-CM

## 2024-05-13 DIAGNOSIS — G40.909 SEIZURE DISORDER (HCC): ICD-10-CM

## 2024-05-13 DIAGNOSIS — S52.92XS CLOSED FRACTURE OF LEFT RADIUS AND ULNA, SEQUELA: ICD-10-CM

## 2024-05-13 DIAGNOSIS — D50.9 IRON DEFICIENCY ANEMIA, UNSPECIFIED IRON DEFICIENCY ANEMIA TYPE: ICD-10-CM

## 2024-05-13 DIAGNOSIS — S52.202S CLOSED FRACTURE OF LEFT RADIUS AND ULNA, SEQUELA: ICD-10-CM

## 2024-05-13 DIAGNOSIS — Z76.89 ENCOUNTER TO ESTABLISH CARE WITH NEW DOCTOR: Primary | ICD-10-CM

## 2024-05-13 PROBLEM — S52.92XA CLOSED FRACTURE OF LEFT RADIUS AND ULNA: Status: ACTIVE | Noted: 2024-05-13

## 2024-05-13 PROBLEM — S52.202A CLOSED FRACTURE OF LEFT RADIUS AND ULNA: Status: ACTIVE | Noted: 2024-05-13

## 2024-05-13 PROCEDURE — 99204 OFFICE O/P NEW MOD 45 MIN: CPT | Performed by: FAMILY MEDICINE

## 2024-05-13 SDOH — ECONOMIC STABILITY: FOOD INSECURITY: WITHIN THE PAST 12 MONTHS, THE FOOD YOU BOUGHT JUST DIDN'T LAST AND YOU DIDN'T HAVE MONEY TO GET MORE.: NEVER TRUE

## 2024-05-13 SDOH — ECONOMIC STABILITY: INCOME INSECURITY: HOW HARD IS IT FOR YOU TO PAY FOR THE VERY BASICS LIKE FOOD, HOUSING, MEDICAL CARE, AND HEATING?: PATIENT DECLINED

## 2024-05-13 SDOH — ECONOMIC STABILITY: FOOD INSECURITY: WITHIN THE PAST 12 MONTHS, YOU WORRIED THAT YOUR FOOD WOULD RUN OUT BEFORE YOU GOT MONEY TO BUY MORE.: NEVER TRUE

## 2024-05-13 ASSESSMENT — ENCOUNTER SYMPTOMS
NAUSEA: 0
VOMITING: 0
SHORTNESS OF BREATH: 0
DIARRHEA: 0
COUGH: 0

## 2024-05-13 ASSESSMENT — PATIENT HEALTH QUESTIONNAIRE - PHQ9
SUM OF ALL RESPONSES TO PHQ9 QUESTIONS 1 & 2: 0
1. LITTLE INTEREST OR PLEASURE IN DOING THINGS: NOT AT ALL
SUM OF ALL RESPONSES TO PHQ QUESTIONS 1-9: 0
SUM OF ALL RESPONSES TO PHQ QUESTIONS 1-9: 0
2. FEELING DOWN, DEPRESSED OR HOPELESS: NOT AT ALL
SUM OF ALL RESPONSES TO PHQ QUESTIONS 1-9: 0
SUM OF ALL RESPONSES TO PHQ QUESTIONS 1-9: 0

## 2024-05-13 NOTE — ASSESSMENT & PLAN NOTE
No longer on blood thinners.  Previously seen by Cardiology in [place].  Will have her return for lipid panel in a few months.

## 2024-05-13 NOTE — PATIENT INSTRUCTIONS
IT WAS GREAT TO SEE YOU TODAY!    WE WILL CHECK LABS WHEN YOU RETURN IN 6 MONTHS SO COME FASTING (NOTHING TO EAT OR DRINK EXCEPT YOUR MEDICINE AND PLAIN WATER AFTER MIDNIGHT THE NIGHT BEFORE).    I WILL SEE YOU AGAIN IN 6 MONTHS BUT PLEASE CALL WITH CONCERNS [phone]

## 2024-05-13 NOTE — ASSESSMENT & PLAN NOTE
No seizures since a procedure back in 2013.  Was being followed by Dr. Basurto in [place].  Not currently on seizure medication.  Will continue to monitor.

## 2024-05-13 NOTE — ASSESSMENT & PLAN NOTE
Status post fixation in [place] in March 2024.  May need to have repeat surgery.  Will continue to monitor.

## 2024-05-13 NOTE — PROGRESS NOTES
[facility]  Petra Cortez Kal, DO  [address], [address]  [place] [id number] [phone]          ASSESSMENT AND PLAN    Problem List Items Addressed This Visit          Nervous and Auditory    Seizure disorder (HCC)     No seizures since a procedure back in 2013.  Was being followed by Dr. Basurto in [place].  Not currently on seizure medication.  Will continue to monitor.            Musculoskeletal and Integument    Closed fracture of left radius and ulna      Status post fixation in [place] in March 2024.  May need to have repeat surgery.  Will continue to monitor.            Other    BRIE (iron deficiency anemia)      Followed by Hematology/Oncology, s/p iron infusions.  Last CBC WNL.  Will continue to monitor.         Encounter to establish care with new doctor - Primary    History of acute myocardial infarction      No longer on blood thinners.  Previously seen by Cardiology in [place].  Will have her return for lipid panel in a few months.             The diagnoses and plan were discussed with the patient, who verbalizes understanding and agrees with plan.  All questions answered.    Chief Complaint    Chief Complaint   Patient presents with    New Patient     Pt has no major concerns.        HISTORY OF PRESENT ILLNESS    53 y.o. female presents today to establish care with a new primary care provider.  Used to go to [facility].  History of MI in 2018, did not require a stent, states on the cath her heart looked good so they broke up the embolus.  States that she did not take a blood thinner at the time of her heart attack.  States that she fell and broke her left arm in December 2023, had to have surgery and developed blood clot and started on a blood thinner.  States then developed iron deficiency anemia and had to get iron infusions.  Not currently on a blood thinner, taking iron and follows up with Hematology/Oncology next month.  Diagnosed

## 2024-06-13 ENCOUNTER — OFFICE VISIT (OUTPATIENT)
Dept: OBGYN CLINIC | Age: 54
End: 2024-06-13

## 2024-06-13 VITALS
BODY MASS INDEX: 38.65 KG/M2 | SYSTOLIC BLOOD PRESSURE: 122 MMHG | HEIGHT: 70 IN | DIASTOLIC BLOOD PRESSURE: 76 MMHG | WEIGHT: 270 LBS

## 2024-06-13 DIAGNOSIS — N92.1 MENORRHAGIA WITH IRREGULAR CYCLE: Primary | ICD-10-CM

## 2024-06-13 RX ORDER — FERROUS SULFATE 324(65)MG
325 TABLET, DELAYED RELEASE (ENTERIC COATED) ORAL DAILY
COMMUNITY
Start: 2021-07-28

## 2024-06-13 ASSESSMENT — ENCOUNTER SYMPTOMS
EYES NEGATIVE: 1
GASTROINTESTINAL NEGATIVE: 1
RESPIRATORY NEGATIVE: 1
ALLERGIC/IMMUNOLOGIC NEGATIVE: 1

## 2024-06-13 NOTE — PROGRESS NOTES
Name: Maryanne Modi  Date: 2024  YOB: 1970  LMP: No LMP recorded.      Maryanne is a 53 y.o.   who is here for a follow up for irregular menses . She is off of the anticoagulants and notices an improvement in her periods, although they are still heavy.  She is having cont, spotting through out her cycle.    She did receive IV iron. Last hbg in April improved from 9 to 11    Maryanne  reports that she is not currently sexually active. She reports using the following method of birth control/protection: None.  Contraception: abstinence.   Menstrual status: irregular cycles  Gyn Surgery: none     Women's Health Timeline:  No specialty comments available.      Pap History:  No results found for: \"DIAG\", \"[id number]\", \"HPVGENOREFL\"     OB History:  OB History    No obstetric history on file.          Medical History:  Past Medical History:  No date: DVT (deep venous thrombosis) ([facility])      Comment:  left leg  No date: Epilepsy ([facility])  2018: Heart attack ([facility])     Surgical History:  Past Surgical History:  No date: ORTHOPEDIC SURGERY; Left      Comment:  wrist/elbow  2013: OTHER SURGICAL HISTORY      Comment:  Brain - for seizures (Epilepsy)     Meds:    Current Outpatient Medications:     ferrous sulfate 324 (65 Fe) MG EC tablet, Take 325 mg by mouth daily, Disp: , Rfl:     medroxyPROGESTERone (PROVERA) 10 MG tablet, TAKE 1 TABLET BY MOUTH EVERY DAY, Disp: 90 tablet, Rfl: 1    Multiple Vitamins-Minerals (MULTIVITAL PO), Take by mouth, Disp: , Rfl:     Cholecalciferol (VITAMIN D3) 50 MCG ( UT) CAPS, Take by mouth, Disp: , Rfl:     EPINEPHrine (EPIPEN) 0.3 MG/0.3ML SOAJ injection, , Disp: , Rfl:     ibuprofen (ADVIL;MOTRIN) 800 MG tablet, Take 1 tablet by mouth every 6 hours as needed for Pain, Disp: 20 tablet, Rfl: 0    Family Cancer History:   Cancer-related family history is not on file.     Social History:    reports that she has never smoked. She has been exposed to tobacco smoke. She

## 2024-07-22 ENCOUNTER — OFFICE VISIT (OUTPATIENT)
Dept: ONCOLOGY | Age: 54
End: 2024-07-22
Payer: COMMERCIAL

## 2024-07-22 ENCOUNTER — HOSPITAL ENCOUNTER (OUTPATIENT)
Dept: LAB | Age: 54
Discharge: HOME | End: 2024-07-25
Payer: COMMERCIAL

## 2024-07-22 VITALS
RESPIRATION RATE: 18 BRPM | WEIGHT: 274.8 LBS | SYSTOLIC BLOOD PRESSURE: 119 MMHG | TEMPERATURE: 98.1 F | HEART RATE: 93 BPM | HEIGHT: 71 IN | DIASTOLIC BLOOD PRESSURE: 76 MMHG | BODY MASS INDEX: 38.47 KG/M2 | OXYGEN SATURATION: 97 %

## 2024-07-22 DIAGNOSIS — D50.0 IRON DEFICIENCY ANEMIA DUE TO CHRONIC BLOOD LOSS: Primary | ICD-10-CM

## 2024-07-22 DIAGNOSIS — Z86.718 HISTORY OF DEEP VEIN THROMBOSIS OF LOWER EXTREMITY: ICD-10-CM

## 2024-07-22 DIAGNOSIS — D50.0 IRON DEFICIENCY ANEMIA DUE TO CHRONIC BLOOD LOSS: ICD-10-CM

## 2024-07-22 LAB
ALBUMIN SERPL-MCNC: 3.6 G/DL (ref 3.5–5)
ALBUMIN/GLOB SERPL: 1 (ref 1–1.9)
ALP SERPL-CCNC: 79 U/L (ref 35–104)
ALT SERPL-CCNC: 12 U/L (ref 12–65)
ANION GAP SERPL CALC-SCNC: 11 MMOL/L (ref 9–18)
AST SERPL-CCNC: 17 U/L (ref 15–37)
BASOPHILS # BLD: 0 K/UL (ref 0–0.2)
BASOPHILS NFR BLD: 0 % (ref 0–2)
BILIRUB SERPL-MCNC: 0.4 MG/DL (ref 0–1.2)
BUN SERPL-MCNC: 8 MG/DL (ref 6–23)
CALCIUM SERPL-MCNC: 9.5 MG/DL (ref 8.8–10.2)
CHLORIDE SERPL-SCNC: 104 MMOL/L (ref 98–107)
CO2 SERPL-SCNC: 24 MMOL/L (ref 20–28)
CREAT SERPL-MCNC: 0.8 MG/DL (ref 0.6–1.1)
DIFFERENTIAL METHOD BLD: NORMAL
EOSINOPHIL # BLD: 0.1 K/UL (ref 0–0.8)
EOSINOPHIL NFR BLD: 1 % (ref 0.5–7.8)
ERYTHROCYTE [DISTWIDTH] IN BLOOD BY AUTOMATED COUNT: 14.6 % (ref 11.9–14.6)
FERRITIN SERPL-MCNC: 8 NG/ML (ref 8–388)
GLOBULIN SER CALC-MCNC: 3.5 G/DL (ref 2.3–3.5)
GLUCOSE SERPL-MCNC: 97 MG/DL (ref 70–99)
HCT VFR BLD AUTO: 36.3 % (ref 35.8–46.3)
HGB BLD-MCNC: 11.8 G/DL (ref 11.7–15.4)
IMM GRANULOCYTES # BLD AUTO: 0 K/UL (ref 0–0.5)
IMM GRANULOCYTES NFR BLD AUTO: 0 % (ref 0–5)
IRON SATN MFR SERPL: 13 % (ref 20–50)
IRON SERPL-MCNC: 40 UG/DL (ref 35–100)
LYMPHOCYTES # BLD: 1.3 K/UL (ref 0.5–4.6)
LYMPHOCYTES NFR BLD: 17 % (ref 13–44)
MCH RBC QN AUTO: 28.2 PG (ref 26.1–32.9)
MCHC RBC AUTO-ENTMCNC: 32.5 G/DL (ref 31.4–35)
MCV RBC AUTO: 86.6 FL (ref 82–102)
MONOCYTES # BLD: 0.5 K/UL (ref 0.1–1.3)
MONOCYTES NFR BLD: 7 % (ref 4–12)
NEUTS SEG # BLD: 5.7 K/UL (ref 1.7–8.2)
NEUTS SEG NFR BLD: 75 % (ref 43–78)
NRBC # BLD: 0 K/UL (ref 0–0.2)
PLATELET # BLD AUTO: 264 K/UL (ref 150–450)
PMV BLD AUTO: 10.8 FL (ref 9.4–12.3)
POTASSIUM SERPL-SCNC: 3.9 MMOL/L (ref 3.5–5.1)
PROT SERPL-MCNC: 7.2 G/DL (ref 6.3–8.2)
RBC # BLD AUTO: 4.19 M/UL (ref 4.05–5.2)
SODIUM SERPL-SCNC: 139 MMOL/L (ref 136–145)
TIBC SERPL-MCNC: 301 UG/DL (ref 240–450)
UIBC SERPL-MCNC: 261 UG/DL (ref 112–347)
WBC # BLD AUTO: 7.7 K/UL (ref 4.3–11.1)

## 2024-07-22 PROCEDURE — 99214 OFFICE O/P EST MOD 30 MIN: CPT | Performed by: NURSE PRACTITIONER

## 2024-07-22 PROCEDURE — 83550 IRON BINDING TEST: CPT

## 2024-07-22 PROCEDURE — 82728 ASSAY OF FERRITIN: CPT

## 2024-07-22 PROCEDURE — 85025 COMPLETE CBC W/AUTO DIFF WBC: CPT

## 2024-07-22 PROCEDURE — 83540 ASSAY OF IRON: CPT

## 2024-07-22 PROCEDURE — 80053 COMPREHEN METABOLIC PANEL: CPT

## 2024-07-22 PROCEDURE — 36415 COLL VENOUS BLD VENIPUNCTURE: CPT

## 2024-07-22 RX ORDER — ACETAMINOPHEN 325 MG/1
650 TABLET ORAL
OUTPATIENT
Start: 2024-07-22

## 2024-07-22 RX ORDER — SODIUM CHLORIDE 9 MG/ML
INJECTION, SOLUTION INTRAVENOUS CONTINUOUS
OUTPATIENT
Start: 2024-07-22

## 2024-07-22 RX ORDER — FAMOTIDINE 10 MG/ML
20 INJECTION, SOLUTION INTRAVENOUS
OUTPATIENT
Start: 2024-07-22

## 2024-07-22 RX ORDER — SODIUM CHLORIDE 9 MG/ML
5-250 INJECTION, SOLUTION INTRAVENOUS PRN
OUTPATIENT
Start: 2024-07-22

## 2024-07-22 RX ORDER — ONDANSETRON 2 MG/ML
8 INJECTION INTRAMUSCULAR; INTRAVENOUS
OUTPATIENT
Start: 2024-07-22

## 2024-07-22 RX ORDER — HEPARIN SODIUM (PORCINE) LOCK FLUSH IV SOLN 100 UNIT/ML 100 UNIT/ML
500 SOLUTION INTRAVENOUS PRN
OUTPATIENT
Start: 2024-07-22

## 2024-07-22 RX ORDER — SODIUM CHLORIDE 0.9 % (FLUSH) 0.9 %
5-40 SYRINGE (ML) INJECTION PRN
OUTPATIENT
Start: 2024-07-22

## 2024-07-22 RX ORDER — ACETAMINOPHEN 325 MG/1
650 TABLET ORAL EVERY 6 HOURS PRN
COMMUNITY
Start: 2023-12-19

## 2024-07-22 RX ORDER — EPINEPHRINE 1 MG/ML
0.3 INJECTION, SOLUTION, CONCENTRATE INTRAVENOUS PRN
OUTPATIENT
Start: 2024-07-22

## 2024-07-22 RX ORDER — DIPHENHYDRAMINE HYDROCHLORIDE 50 MG/ML
50 INJECTION INTRAMUSCULAR; INTRAVENOUS
OUTPATIENT
Start: 2024-07-22

## 2024-07-22 RX ORDER — ALBUTEROL SULFATE 90 UG/1
4 AEROSOL, METERED RESPIRATORY (INHALATION) PRN
OUTPATIENT
Start: 2024-07-22

## 2024-07-22 NOTE — PROGRESS NOTES
extremity          Patient Active Problem List   Diagnosis    Seizure disorder (HCC)    Complex partial seizures (HCC)    Severe obesity (HCC)    BRIE (iron deficiency anemia)    Encounter to establish care with new doctor    History of acute myocardial infarction    Closed fracture of left radius and ulna           PLAN:  Lab studies were personally reviewed.    VTE: discovered in January 2024, left popliteal/peroneal veins, provoked by trauma/surgery.  On Xarelto but with worsening menorrhagia since starting.  I discussed the pathophysiology and natural history of VTE with Ms. Modi.  Since she had a reversible risk factor and a distal DVT, I am comfortable with 3 months of DOAC (April 2024) assuming ultrasound and D-dimer are negative at that time.  She remains anemic despite iron replacement, this is due to menorrhagia caused by fibroids and exacerbated by AC.  I would increase her iron to BID (TID if possible), offered IV iron but she will think about this.  She completed 3 months of Xarelto in April 2024.        She returns today for follow up for BRIE with hx of DVT.  Last received Feraheme x 2 in March.  Since last seen she has continued with menorrhagia, reports only a few days a month that she doesn't bleed.  Fatigue is ongoing.  There is no nausea and constipation is controlled with oral iron once daily with vit C.  She continues to have trace LLE edema, no pain.  Labs reviewed, Hgb is stable at 11.8, however iron stores are dropping again, ferritin down to 8 and Fe sat is 13%.  She will proceed with repeat Feraheme x 2 and continue with daily oral iron with vitamin C due to her ongoing menorrhagia.  It was suggested that she discuss her ongoing menorrhagia with GYN as her quality of life is being affected.  She is agreeable with this plan. All questions were asked and answered to the best of my ability.  We will plan to repeat labs in ~3 months or sooner if needed.           Jovanna Osroio (Mishelle),

## 2024-08-03 ENCOUNTER — HOSPITAL ENCOUNTER (OUTPATIENT)
Dept: INFUSION THERAPY | Age: 54
Setting detail: INFUSION SERIES
Discharge: HOME | End: 2024-08-03
Payer: COMMERCIAL

## 2024-08-03 VITALS
RESPIRATION RATE: 18 BRPM | TEMPERATURE: 97.7 F | DIASTOLIC BLOOD PRESSURE: 79 MMHG | OXYGEN SATURATION: 98 % | SYSTOLIC BLOOD PRESSURE: 116 MMHG | HEART RATE: 63 BPM

## 2024-08-03 DIAGNOSIS — D50.9 IRON DEFICIENCY ANEMIA, UNSPECIFIED IRON DEFICIENCY ANEMIA TYPE: Primary | ICD-10-CM

## 2024-08-03 PROCEDURE — 6360000002 HC RX W HCPCS: Performed by: NURSE PRACTITIONER

## 2024-08-03 PROCEDURE — 2580000003 HC RX 258: Performed by: NURSE PRACTITIONER

## 2024-08-03 PROCEDURE — 96365 THER/PROPH/DIAG IV INF INIT: CPT

## 2024-08-03 RX ORDER — DIPHENHYDRAMINE HYDROCHLORIDE 50 MG/ML
50 INJECTION INTRAMUSCULAR; INTRAVENOUS
OUTPATIENT
Start: 2024-08-10

## 2024-08-03 RX ORDER — ALBUTEROL SULFATE 90 UG/1
4 AEROSOL, METERED RESPIRATORY (INHALATION) PRN
OUTPATIENT
Start: 2024-08-10

## 2024-08-03 RX ORDER — ONDANSETRON 2 MG/ML
8 INJECTION INTRAMUSCULAR; INTRAVENOUS
OUTPATIENT
Start: 2024-08-10

## 2024-08-03 RX ORDER — SODIUM CHLORIDE 9 MG/ML
INJECTION, SOLUTION INTRAVENOUS CONTINUOUS
OUTPATIENT
Start: 2024-08-10

## 2024-08-03 RX ORDER — EPINEPHRINE 1 MG/ML
0.3 INJECTION, SOLUTION, CONCENTRATE INTRAVENOUS PRN
OUTPATIENT
Start: 2024-08-10

## 2024-08-03 RX ORDER — HEPARIN 100 UNIT/ML
500 SYRINGE INTRAVENOUS PRN
OUTPATIENT
Start: 2024-08-10

## 2024-08-03 RX ORDER — SODIUM CHLORIDE 0.9 % (FLUSH) 0.9 %
5-40 SYRINGE (ML) INJECTION PRN
OUTPATIENT
Start: 2024-08-10

## 2024-08-03 RX ORDER — ACETAMINOPHEN 325 MG/1
650 TABLET ORAL
OUTPATIENT
Start: 2024-08-10

## 2024-08-03 RX ORDER — SODIUM CHLORIDE 9 MG/ML
5-250 INJECTION, SOLUTION INTRAVENOUS PRN
Status: DISCONTINUED | OUTPATIENT
Start: 2024-08-03 | End: 2024-08-04 | Stop reason: HOSPADM

## 2024-08-03 RX ORDER — SODIUM CHLORIDE 9 MG/ML
5-250 INJECTION, SOLUTION INTRAVENOUS PRN
OUTPATIENT
Start: 2024-08-10

## 2024-08-03 RX ADMIN — SODIUM CHLORIDE 50 ML/HR: 9 INJECTION, SOLUTION INTRAVENOUS at 08:23

## 2024-08-03 RX ADMIN — FERUMOXYTOL 510 MG: 510 INJECTION INTRAVENOUS at 08:26

## 2024-08-03 NOTE — PROGRESS NOTES
Arrived to the Infusion Center.  feraheme completed. Patient tolerated well.   Any issues or concerns during appointment: no.  Patient aware of next infusion appointment on 8/10 at 0800  Patient instructed to call provider with temperature of 100.4 or greater or nausea/vomiting/ diarrhea or pain not controlled by medications  Discharged to home ambulatory.

## 2024-08-04 ENCOUNTER — HOSPITAL ENCOUNTER (EMERGENCY)
Age: 54
Discharge: HOME | End: 2024-08-04
Payer: COMMERCIAL

## 2024-08-04 ENCOUNTER — APPOINTMENT (OUTPATIENT)
Dept: GENERAL RADIOLOGY | Age: 54
End: 2024-08-04
Payer: COMMERCIAL

## 2024-08-04 VITALS
SYSTOLIC BLOOD PRESSURE: 144 MMHG | BODY MASS INDEX: 38.76 KG/M2 | WEIGHT: 274 LBS | TEMPERATURE: 101 F | RESPIRATION RATE: 18 BRPM | HEART RATE: 92 BPM | OXYGEN SATURATION: 98 % | DIASTOLIC BLOOD PRESSURE: 91 MMHG

## 2024-08-04 DIAGNOSIS — U07.1 COVID-19: Primary | ICD-10-CM

## 2024-08-04 LAB
ALBUMIN SERPL-MCNC: 3.8 G/DL (ref 3.5–5)
ALBUMIN/GLOB SERPL: 1.1 (ref 1–1.9)
ALP SERPL-CCNC: 87 U/L (ref 35–104)
ANION GAP SERPL CALC-SCNC: 13 MMOL/L (ref 9–18)
AST SERPL-CCNC: 20 U/L (ref 15–37)
BASOPHILS # BLD: 0 K/UL (ref 0–0.2)
BASOPHILS NFR BLD: 0 % (ref 0–2)
BILIRUB SERPL-MCNC: 0.3 MG/DL (ref 0–1.2)
BUN SERPL-MCNC: 6 MG/DL (ref 6–23)
CALCIUM SERPL-MCNC: 9.4 MG/DL (ref 8.8–10.2)
CHLORIDE SERPL-SCNC: 99 MMOL/L (ref 98–107)
CO2 SERPL-SCNC: 23 MMOL/L (ref 20–28)
CREAT SERPL-MCNC: 0.91 MG/DL (ref 0.6–1.1)
DIFFERENTIAL METHOD BLD: ABNORMAL
EOSINOPHIL # BLD: 0.1 K/UL (ref 0–0.8)
EOSINOPHIL NFR BLD: 1 % (ref 0.5–7.8)
ERYTHROCYTE [DISTWIDTH] IN BLOOD BY AUTOMATED COUNT: 14.6 % (ref 11.9–14.6)
FLUAV RNA SPEC QL NAA+PROBE: NOT DETECTED
FLUBV RNA SPEC QL NAA+PROBE: NOT DETECTED
GLOBULIN SER CALC-MCNC: 3.6 G/DL (ref 2.3–3.5)
GLUCOSE SERPL-MCNC: 91 MG/DL (ref 70–99)
HCT VFR BLD AUTO: 38.1 % (ref 35.8–46.3)
HGB BLD-MCNC: 12.5 G/DL (ref 11.7–15.4)
IMM GRANULOCYTES # BLD AUTO: 0 K/UL (ref 0–0.5)
IMM GRANULOCYTES NFR BLD AUTO: 0 % (ref 0–5)
LYMPHOCYTES # BLD: 0.5 K/UL (ref 0.5–4.6)
LYMPHOCYTES NFR BLD: 5 % (ref 13–44)
MCH RBC QN AUTO: 28.6 PG (ref 26.1–32.9)
MCHC RBC AUTO-ENTMCNC: 32.8 G/DL (ref 31.4–35)
MCV RBC AUTO: 87.2 FL (ref 82–102)
MONOCYTES # BLD: 0.6 K/UL (ref 0.1–1.3)
MONOCYTES NFR BLD: 8 % (ref 4–12)
NEUTS SEG # BLD: 7.2 K/UL (ref 1.7–8.2)
NEUTS SEG NFR BLD: 86 % (ref 43–78)
NRBC # BLD: 0 K/UL (ref 0–0.2)
PLATELET # BLD AUTO: 236 K/UL (ref 150–450)
PMV BLD AUTO: 10.8 FL (ref 9.4–12.3)
POTASSIUM SERPL-SCNC: 4 MMOL/L (ref 3.5–5.1)
PROCALCITONIN SERPL-MCNC: 0.08 NG/ML (ref 0–0.1)
PROT SERPL-MCNC: 7.4 G/DL (ref 6.3–8.2)
RBC # BLD AUTO: 4.37 M/UL (ref 4.05–5.2)
SARS-COV-2 RDRP RESP QL NAA+PROBE: DETECTED
SODIUM SERPL-SCNC: 135 MMOL/L (ref 136–145)
SOURCE: ABNORMAL
WBC # BLD AUTO: 8.5 K/UL (ref 4.3–11.1)

## 2024-08-04 PROCEDURE — 85025 COMPLETE CBC W/AUTO DIFF WBC: CPT

## 2024-08-04 PROCEDURE — 99284 EMERGENCY DEPT VISIT MOD MDM: CPT

## 2024-08-04 PROCEDURE — 87635 SARS-COV-2 COVID-19 AMP PRB: CPT

## 2024-08-04 PROCEDURE — 96360 HYDRATION IV INFUSION INIT: CPT

## 2024-08-04 PROCEDURE — 2580000003 HC RX 258: Performed by: NURSE PRACTITIONER

## 2024-08-04 PROCEDURE — 71045 X-RAY EXAM CHEST 1 VIEW: CPT

## 2024-08-04 PROCEDURE — 84145 PROCALCITONIN (PCT): CPT

## 2024-08-04 PROCEDURE — 80053 COMPREHEN METABOLIC PANEL: CPT

## 2024-08-04 PROCEDURE — 6370000000 HC RX 637 (ALT 250 FOR IP): Performed by: NURSE PRACTITIONER

## 2024-08-04 PROCEDURE — 87502 INFLUENZA DNA AMP PROBE: CPT

## 2024-08-04 RX ORDER — 0.9 % SODIUM CHLORIDE 0.9 %
1000 INTRAVENOUS SOLUTION INTRAVENOUS
Status: COMPLETED | OUTPATIENT
Start: 2024-08-04 | End: 2024-08-04

## 2024-08-04 RX ORDER — ACETAMINOPHEN 500 MG
1000 TABLET ORAL ONCE
Status: COMPLETED | OUTPATIENT
Start: 2024-08-04 | End: 2024-08-04

## 2024-08-04 RX ADMIN — ACETAMINOPHEN 1000 MG: 500 TABLET, FILM COATED ORAL at 18:11

## 2024-08-04 RX ADMIN — SODIUM CHLORIDE 1000 ML: 9 INJECTION, SOLUTION INTRAVENOUS at 17:45

## 2024-08-04 NOTE — ED NOTES
I have reviewed discharge instructions with the patient.  The patient verbalized understanding.    Patient left ED via Discharge Method: ambulatory to Home with family.    Opportunity for questions and clarification provided.       Patient given 0 scripts.         To continue your aftercare when you leave the hospital, you may receive an automated call from our care team to check in on how you are doing.  This is a free service and part of our promise to provide the best care and service to meet your aftercare needs.” If you have questions, or wish to unsubscribe from this service please call [phone].  Thank you for Choosing our [facility] Emergency Department.

## 2024-08-04 NOTE — ED TRIAGE NOTES
Pt states she got iron infusion yesterday and after infusion started feeling like she had sob and sore throat as well as some drowsiness, pt states she then started having chills today. Pt states she did not take any medications PTA.

## 2024-08-04 NOTE — ED PROVIDER NOTES
K/UL    Immature Granulocytes Absolute 0.0 0.0 - 0.5 K/UL   CMP   Result Value Ref Range    Sodium 135 (L) 136 - 145 mmol/L    Potassium 4.0 3.5 - 5.1 mmol/L    Chloride 99 98 - 107 mmol/L    CO2 23 20 - 28 mmol/L    Anion Gap 13 9 - 18 mmol/L    Glucose 91 70 - 99 mg/dL    BUN 6 6 - 23 MG/DL    Creatinine 0.91 0.60 - 1.10 MG/DL    Est, Glom Filt Rate 76 >60 ml/min/1.73m2    Calcium 9.4 8.8 - 10.2 MG/DL    Total Bilirubin 0.3 0.0 - 1.2 MG/DL    ALT 15 12 - 65 U/L    AST 20 15 - 37 U/L    Alk Phosphatase 87 35 - 104 U/L    Total Protein 7.4 6.3 - 8.2 g/dL    Albumin 3.8 3.5 - 5.0 g/dL    Globulin 3.6 (H) 2.3 - 3.5 g/dL    Albumin/Globulin Ratio 1.1 1.0 - 1.9     Procalcitonin   Result Value Ref Range    Procalcitonin 0.08 0.00 - 0.10 ng/mL         XR CHEST PORTABLE   Final Result   1.  No acute pulmonary findings.          Electronically signed by KAMILA PARADA                   Recent Labs     08/04/24  1744   COVID19 Detected*       Voice dictation software was used during the making of this note.  This software is not perfect and grammatical and other typographical errors may be present.  This note has not been completely proofread for errors.        Telma Salazar, APRN - NP  08/04/24 1598

## 2024-08-04 NOTE — DISCHARGE INSTRUCTIONS
COVID test is positive today.  Blood work is reassuring.  use over-the-counter medications for treatment of your viral illness.  For congestion, Mucinex.  Afrin nose spray to help with facial congestion.  Robitussin for cough. Tylenol for fever 1g every 6 hours.  Return to the ED for new or worsening symptoms.

## 2024-08-07 ENCOUNTER — OFFICE VISIT (OUTPATIENT)
Dept: PRIMARY CARE CLINIC | Facility: CLINIC | Age: 54
End: 2024-08-07
Payer: COMMERCIAL

## 2024-08-07 VITALS
SYSTOLIC BLOOD PRESSURE: 118 MMHG | WEIGHT: 275 LBS | HEIGHT: 71 IN | TEMPERATURE: 97.4 F | OXYGEN SATURATION: 98 % | HEART RATE: 89 BPM | BODY MASS INDEX: 38.5 KG/M2 | DIASTOLIC BLOOD PRESSURE: 80 MMHG

## 2024-08-07 DIAGNOSIS — U07.1 COVID-19 VIRUS INFECTION: Primary | ICD-10-CM

## 2024-08-07 PROCEDURE — 99213 OFFICE O/P EST LOW 20 MIN: CPT

## 2024-08-07 RX ORDER — TRAZODONE HYDROCHLORIDE 50 MG/1
TABLET ORAL
COMMUNITY
Start: 2024-08-06

## 2024-08-07 ASSESSMENT — ENCOUNTER SYMPTOMS
VOMITING: 0
FACIAL SWELLING: 0
SINUS PAIN: 0
RHINORRHEA: 0
DIARRHEA: 0
SHORTNESS OF BREATH: 1
SINUS PRESSURE: 1
SORE THROAT: 1
VOICE CHANGE: 0
CHEST TIGHTNESS: 0
TROUBLE SWALLOWING: 0
NAUSEA: 0
COUGH: 1
BLOOD IN STOOL: 0

## 2024-08-07 ASSESSMENT — PATIENT HEALTH QUESTIONNAIRE - PHQ9
SUM OF ALL RESPONSES TO PHQ QUESTIONS 1-9: 0
1. LITTLE INTEREST OR PLEASURE IN DOING THINGS: NOT AT ALL
SUM OF ALL RESPONSES TO PHQ9 QUESTIONS 1 & 2: 0
2. FEELING DOWN, DEPRESSED OR HOPELESS: NOT AT ALL
SUM OF ALL RESPONSES TO PHQ QUESTIONS 1-9: 0

## 2024-08-07 NOTE — ASSESSMENT & PLAN NOTE
Acute illness.   - diagnosed 8/4 Sunday in ER  - symptom onset 8/3 Saturday  - symptoms continuing, including fever >101 when hasn't been taking tylenol  - paxlovid non adjusted dose sent to pharmacy. Did drug interaction . Advised to either take half of trazodone that recently started for sleep or not at all while taking paxlovid.  - advised heavy hydration & tylenol & honey with hot tea  - discussed emergency symptoms  - provided work note through at least after Friday

## 2024-08-07 NOTE — PATIENT INSTRUCTIONS
IT WAS GREAT TO MEET YOU TODAY! HOPE YOU FEEL BETTER VERY SOON.    PLEASE TAKE ALL MEDICATION AS DISCUSSED.   - EITHER TAKE 1/2 OF YOUR TRAZODONE OR DON'T TAKE WHILE ON THE PAXLOVID. TAKE THE PAXLOVID AS DIRECTED ON THE PACKAGE FOR 5 DAYS.   - CONTINUE TYLENOL AS NEEDED FOR FEVER. ONCE YOU HAVE BEEN WITHOUT A FEVER FOR 24 HOURS WITHOUT USE OF FEVER-REDUCING MEDICATION, YOU CAN RETURN TO WORK. PLEASE LET US KNOW, IF YOU NEED ANOTHER WORK EXCUSE COME MONDAY  - CONTINUE HONEY FOR SORE THROAT AND HOT TEAS    PLEASE IMMEDIATELY GO TO ER OR CALL 911 FOR ACUTE WORSENING OF SOB, CONFUSION, CHEST PAIN OR ANY OTHER SIGNS OR SYMPTOMS THAT YOU THINK WARRANT DOING SO.    DRINK LOTS OF WATER! WEAR YOUR SEATBELT.     WE WILL SEE YOU AGAIN AT YOUR FOLLOW-UP WITH DR. GARCIA 11/13/2024, BUT PLEASE CALL WITH CONCERNS [phone]

## 2024-08-07 NOTE — PROGRESS NOTES
[facility]  Lucía \"Nancy\" NAE Reese  [address]  [place] [id number] [phone]         ASSESSMENT AND PLAN    Problem List Items Addressed This Visit          Other    COVID-19 virus infection - Primary     Acute illness.   - diagnosed 8/4 Sunday in ER  - symptom onset 8/3 Saturday  - symptoms continuing, including fever >101 when hasn't been taking tylenol  - paxlovid non adjusted dose sent to pharmacy. Did drug interaction . Advised to either take half of trazodone that recently started for sleep or not at all while taking paxlovid.  - advised heavy hydration & tylenol & honey with hot tea  - discussed emergency symptoms  - provided work note through at least after Friday         Relevant Medications    nirmatrelvir/ritonavir 300/100 (PAXLOVID) 20 x 150 MG & 10 x 100MG TBPK        The diagnoses and plan were discussed with the patient, who verbalizes understanding and agrees with plan.  All questions answered.    Chief Complaint    Chief Complaint   Patient presents with    Pharyngitis     Worse at night     Fever     On and off, come back after tylenol is worn off    Other     Symptoms after getting infusion          HISTORY OF PRESENT ILLNESS    53 y.o. female with past medical history of seizure disorder not currently on antiepileptic medication, obesity, BRIE, DVT of LLE (2024, anticoagulation stopped after confirmation of resolution spring 2024) & acute MI w/o stents who presents today for follow up.    Went to ED on 8/4 and tested positive. ED physician stated can return to work Thursday. However, patient was confused on wether or not positive, based on MyChart display of results just stating \"abnormal\" Works for the [employer]. Symptoms are sore throat started Saturday, starting to get some congestion, states feels like a bad head a cold. Denies myalgias and arthralgias. Reports was using honey for throat yesterday and stopped the tylenol.

## 2024-08-10 ENCOUNTER — APPOINTMENT (OUTPATIENT)
Dept: INFUSION THERAPY | Age: 54
End: 2024-08-10
Payer: COMMERCIAL

## 2024-08-14 ENCOUNTER — HOSPITAL ENCOUNTER (OUTPATIENT)
Dept: INFUSION THERAPY | Age: 54
Setting detail: INFUSION SERIES
Discharge: HOME | End: 2024-08-14
Payer: COMMERCIAL

## 2024-08-14 VITALS
TEMPERATURE: 98 F | SYSTOLIC BLOOD PRESSURE: 131 MMHG | HEART RATE: 68 BPM | RESPIRATION RATE: 20 BRPM | DIASTOLIC BLOOD PRESSURE: 73 MMHG | OXYGEN SATURATION: 98 %

## 2024-08-14 DIAGNOSIS — D50.9 IRON DEFICIENCY ANEMIA, UNSPECIFIED IRON DEFICIENCY ANEMIA TYPE: Primary | ICD-10-CM

## 2024-08-14 PROCEDURE — 6360000002 HC RX W HCPCS: Performed by: NURSE PRACTITIONER

## 2024-08-14 PROCEDURE — 96365 THER/PROPH/DIAG IV INF INIT: CPT

## 2024-08-14 PROCEDURE — 2580000003 HC RX 258: Performed by: NURSE PRACTITIONER

## 2024-08-14 RX ORDER — EPINEPHRINE 1 MG/ML
0.3 INJECTION, SOLUTION, CONCENTRATE INTRAVENOUS PRN
Status: DISCONTINUED | OUTPATIENT
Start: 2024-08-14 | End: 2024-08-15 | Stop reason: HOSPADM

## 2024-08-14 RX ORDER — SODIUM CHLORIDE 0.9 % (FLUSH) 0.9 %
5-40 SYRINGE (ML) INJECTION PRN
Status: DISCONTINUED | OUTPATIENT
Start: 2024-08-14 | End: 2024-08-15 | Stop reason: HOSPADM

## 2024-08-14 RX ORDER — SODIUM CHLORIDE 9 MG/ML
5-250 INJECTION, SOLUTION INTRAVENOUS PRN
Status: DISCONTINUED | OUTPATIENT
Start: 2024-08-14 | End: 2024-08-15 | Stop reason: HOSPADM

## 2024-08-14 RX ORDER — ACETAMINOPHEN 325 MG/1
650 TABLET ORAL
Status: DISCONTINUED | OUTPATIENT
Start: 2024-08-14 | End: 2024-08-15 | Stop reason: HOSPADM

## 2024-08-14 RX ORDER — EPINEPHRINE 1 MG/ML
0.3 INJECTION, SOLUTION, CONCENTRATE INTRAVENOUS PRN
OUTPATIENT
Start: 2024-08-17

## 2024-08-14 RX ORDER — SODIUM CHLORIDE 9 MG/ML
5-250 INJECTION, SOLUTION INTRAVENOUS PRN
Status: CANCELLED | OUTPATIENT
Start: 2024-08-17

## 2024-08-14 RX ORDER — SODIUM CHLORIDE 0.9 % (FLUSH) 0.9 %
5-40 SYRINGE (ML) INJECTION PRN
OUTPATIENT
Start: 2024-08-17

## 2024-08-14 RX ORDER — HEPARIN 100 UNIT/ML
500 SYRINGE INTRAVENOUS PRN
OUTPATIENT
Start: 2024-08-17

## 2024-08-14 RX ORDER — ALBUTEROL SULFATE 90 UG/1
4 AEROSOL, METERED RESPIRATORY (INHALATION) PRN
Status: DISCONTINUED | OUTPATIENT
Start: 2024-08-14 | End: 2024-08-15 | Stop reason: HOSPADM

## 2024-08-14 RX ORDER — DIPHENHYDRAMINE HYDROCHLORIDE 50 MG/ML
50 INJECTION INTRAMUSCULAR; INTRAVENOUS
OUTPATIENT
Start: 2024-08-17

## 2024-08-14 RX ORDER — ALBUTEROL SULFATE 90 UG/1
4 AEROSOL, METERED RESPIRATORY (INHALATION) PRN
OUTPATIENT
Start: 2024-08-17

## 2024-08-14 RX ORDER — ONDANSETRON 2 MG/ML
8 INJECTION INTRAMUSCULAR; INTRAVENOUS
Status: DISCONTINUED | OUTPATIENT
Start: 2024-08-14 | End: 2024-08-15 | Stop reason: HOSPADM

## 2024-08-14 RX ORDER — ONDANSETRON 2 MG/ML
8 INJECTION INTRAMUSCULAR; INTRAVENOUS
OUTPATIENT
Start: 2024-08-17

## 2024-08-14 RX ORDER — DIPHENHYDRAMINE HYDROCHLORIDE 50 MG/ML
50 INJECTION INTRAMUSCULAR; INTRAVENOUS
Status: DISCONTINUED | OUTPATIENT
Start: 2024-08-14 | End: 2024-08-15 | Stop reason: HOSPADM

## 2024-08-14 RX ORDER — ACETAMINOPHEN 325 MG/1
650 TABLET ORAL
OUTPATIENT
Start: 2024-08-17

## 2024-08-14 RX ORDER — SODIUM CHLORIDE 9 MG/ML
INJECTION, SOLUTION INTRAVENOUS CONTINUOUS
OUTPATIENT
Start: 2024-08-17

## 2024-08-14 RX ORDER — SODIUM CHLORIDE 9 MG/ML
5-250 INJECTION, SOLUTION INTRAVENOUS PRN
OUTPATIENT
Start: 2024-08-17

## 2024-08-14 RX ADMIN — SODIUM CHLORIDE 125 ML/HR: 9 INJECTION, SOLUTION INTRAVENOUS at 09:24

## 2024-08-14 RX ADMIN — SODIUM CHLORIDE, PRESERVATIVE FREE 10 ML: 5 INJECTION INTRAVENOUS at 09:24

## 2024-08-14 RX ADMIN — FERUMOXYTOL 510 MG: 510 INJECTION INTRAVENOUS at 09:26

## 2024-08-14 NOTE — PROGRESS NOTES
Patient arrived to Infusion Center for Feraheme. Assessment completed.  No needs voiced at this time. Patient tolerated infusion well and was observed 30 mins post infusion. VSS. Patient discharged ambulatory with mother.

## 2024-10-29 RX ORDER — MEDROXYPROGESTERONE ACETATE 10 MG
10 TABLET ORAL DAILY
Qty: 90 TABLET | Refills: 1 | OUTPATIENT
Start: 2024-10-29

## 2024-10-29 RX ORDER — MEDROXYPROGESTERONE ACETATE 10 MG
10 TABLET ORAL DAILY
Qty: 90 TABLET | Refills: 1 | Status: SHIPPED | OUTPATIENT
Start: 2024-10-29

## 2024-11-13 ENCOUNTER — OFFICE VISIT (OUTPATIENT)
Dept: PRIMARY CARE CLINIC | Facility: CLINIC | Age: 54
End: 2024-11-13
Payer: COMMERCIAL

## 2024-11-13 VITALS
OXYGEN SATURATION: 96 % | BODY MASS INDEX: 39.62 KG/M2 | TEMPERATURE: 97.2 F | HEIGHT: 71 IN | SYSTOLIC BLOOD PRESSURE: 128 MMHG | HEART RATE: 86 BPM | WEIGHT: 283 LBS | DIASTOLIC BLOOD PRESSURE: 82 MMHG

## 2024-11-13 DIAGNOSIS — G40.909 SEIZURE DISORDER (HCC): ICD-10-CM

## 2024-11-13 DIAGNOSIS — N92.1 MENORRHAGIA WITH IRREGULAR CYCLE: Primary | ICD-10-CM

## 2024-11-13 DIAGNOSIS — N92.1 MENORRHAGIA WITH IRREGULAR CYCLE: ICD-10-CM

## 2024-11-13 DIAGNOSIS — Z13.29 SCREENING FOR THYROID DISORDER: ICD-10-CM

## 2024-11-13 DIAGNOSIS — Z12.11 SCREENING FOR COLON CANCER: ICD-10-CM

## 2024-11-13 DIAGNOSIS — Z13.220 SCREENING FOR LIPID DISORDERS: ICD-10-CM

## 2024-11-13 DIAGNOSIS — D50.9 IRON DEFICIENCY ANEMIA, UNSPECIFIED IRON DEFICIENCY ANEMIA TYPE: ICD-10-CM

## 2024-11-13 DIAGNOSIS — S52.92XS CLOSED FRACTURE OF LEFT RADIUS AND ULNA, SEQUELA: ICD-10-CM

## 2024-11-13 DIAGNOSIS — Z12.31 SCREENING MAMMOGRAM FOR BREAST CANCER: ICD-10-CM

## 2024-11-13 DIAGNOSIS — S52.202S CLOSED FRACTURE OF LEFT RADIUS AND ULNA, SEQUELA: ICD-10-CM

## 2024-11-13 LAB
ALBUMIN SERPL-MCNC: 3.6 G/DL (ref 3.5–5)
ALBUMIN/GLOB SERPL: 1 (ref 1–1.9)
ALP SERPL-CCNC: 82 U/L (ref 35–104)
ALT SERPL-CCNC: 40 U/L (ref 8–45)
ANION GAP SERPL CALC-SCNC: 11 MMOL/L (ref 7–16)
AST SERPL-CCNC: 30 U/L (ref 15–37)
BASOPHILS # BLD: 0 K/UL (ref 0–0.2)
BASOPHILS NFR BLD: 0 % (ref 0–2)
BILIRUB SERPL-MCNC: 0.4 MG/DL (ref 0–1.2)
BUN SERPL-MCNC: 8 MG/DL (ref 6–23)
CALCIUM SERPL-MCNC: 9.3 MG/DL (ref 8.8–10.2)
CHLORIDE SERPL-SCNC: 105 MMOL/L (ref 98–107)
CHOLEST SERPL-MCNC: 122 MG/DL (ref 0–200)
CO2 SERPL-SCNC: 24 MMOL/L (ref 20–29)
CREAT SERPL-MCNC: 0.83 MG/DL (ref 0.6–1.1)
DIFFERENTIAL METHOD BLD: NORMAL
EOSINOPHIL # BLD: 0.1 K/UL (ref 0–0.8)
EOSINOPHIL NFR BLD: 1 % (ref 0.5–7.8)
ERYTHROCYTE [DISTWIDTH] IN BLOOD BY AUTOMATED COUNT: 13.4 % (ref 11.9–14.6)
FERRITIN SERPL-MCNC: 18 NG/ML (ref 8–388)
GLOBULIN SER CALC-MCNC: 3.4 G/DL (ref 2.3–3.5)
GLUCOSE SERPL-MCNC: 89 MG/DL (ref 70–99)
HCT VFR BLD AUTO: 40.9 % (ref 35.8–46.3)
HDLC SERPL-MCNC: 50 MG/DL (ref 40–60)
HDLC SERPL: 2.4 (ref 0–5)
HGB BLD-MCNC: 13.1 G/DL (ref 11.7–15.4)
IMM GRANULOCYTES # BLD AUTO: 0 K/UL (ref 0–0.5)
IMM GRANULOCYTES NFR BLD AUTO: 0 % (ref 0–5)
IRON SATN MFR SERPL: 23 % (ref 20–50)
IRON SERPL-MCNC: 64 UG/DL (ref 35–100)
LDLC SERPL CALC-MCNC: 61 MG/DL (ref 0–100)
LYMPHOCYTES # BLD: 1.4 K/UL (ref 0.5–4.6)
LYMPHOCYTES NFR BLD: 18 % (ref 13–44)
MCH RBC QN AUTO: 29.1 PG (ref 26.1–32.9)
MCHC RBC AUTO-ENTMCNC: 32 G/DL (ref 31.4–35)
MCV RBC AUTO: 90.9 FL (ref 82–102)
MONOCYTES # BLD: 0.6 K/UL (ref 0.1–1.3)
MONOCYTES NFR BLD: 8 % (ref 4–12)
NEUTS SEG # BLD: 5.3 K/UL (ref 1.7–8.2)
NEUTS SEG NFR BLD: 73 % (ref 43–78)
NRBC # BLD: 0 K/UL (ref 0–0.2)
PLATELET # BLD AUTO: 236 K/UL (ref 150–450)
PMV BLD AUTO: 11.7 FL (ref 9.4–12.3)
POTASSIUM SERPL-SCNC: 4.1 MMOL/L (ref 3.5–5.1)
PROT SERPL-MCNC: 7 G/DL (ref 6.3–8.2)
RBC # BLD AUTO: 4.5 M/UL (ref 4.05–5.2)
SODIUM SERPL-SCNC: 139 MMOL/L (ref 136–145)
TIBC SERPL-MCNC: 273 UG/DL (ref 240–450)
TRIGL SERPL-MCNC: 53 MG/DL (ref 0–150)
TSH, 3RD GENERATION: 2.12 UIU/ML (ref 0.27–4.2)
UIBC SERPL-MCNC: 209 UG/DL (ref 112–347)
VLDLC SERPL CALC-MCNC: 11 MG/DL (ref 6–23)
WBC # BLD AUTO: 7.4 K/UL (ref 4.3–11.1)

## 2024-11-13 PROCEDURE — 99214 OFFICE O/P EST MOD 30 MIN: CPT | Performed by: FAMILY MEDICINE

## 2024-11-13 RX ORDER — HYDROXYZINE HYDROCHLORIDE 25 MG/1
TABLET, FILM COATED ORAL
COMMUNITY

## 2024-11-13 ASSESSMENT — PATIENT HEALTH QUESTIONNAIRE - PHQ9
SUM OF ALL RESPONSES TO PHQ QUESTIONS 1-9: 0
SUM OF ALL RESPONSES TO PHQ9 QUESTIONS 1 & 2: 0
SUM OF ALL RESPONSES TO PHQ QUESTIONS 1-9: 0
2. FEELING DOWN, DEPRESSED OR HOPELESS: NOT AT ALL
SUM OF ALL RESPONSES TO PHQ QUESTIONS 1-9: 0
SUM OF ALL RESPONSES TO PHQ QUESTIONS 1-9: 0
1. LITTLE INTEREST OR PLEASURE IN DOING THINGS: NOT AT ALL

## 2024-11-13 ASSESSMENT — ENCOUNTER SYMPTOMS
NAUSEA: 0
COUGH: 0
ABDOMINAL PAIN: 0
VOMITING: 0
DIARRHEA: 0
SHORTNESS OF BREATH: 0

## 2024-11-13 NOTE — ASSESSMENT & PLAN NOTE
S/P fixation, told to stop PT but does not want to do a repeat surgery that may not help.  Will continue to follow.

## 2024-11-13 NOTE — PATIENT INSTRUCTIONS
IT WAS GREAT TO SEE YOU TODAY!    I WILL CONTACT YOU WITH THE RESULTS OF YOUR LABS.    I HAVE REFERRED TO GASTROENTEROLOGY FOR YOUR COLONOSCOPY AND TO OB/GYN FOR A SECOND OPINION ABOUT YOUR BLEEDING, THEY WILL CALL YOU TO SCHEDULE APPOINTMENTS.    I WILL SEE YOU AGAIN IN 6 WEEKS/MONTHS BUT PLEASE CALL WITH CONCERNS [phone]

## 2024-11-13 NOTE — ASSESSMENT & PLAN NOTE
Chronic, still with heavy, longer periods with spotting between despite using Provera.  Asks for a second Ob/Gyn opinion, will refer to Dr. Ramos at [facility].

## 2024-11-13 NOTE — PROGRESS NOTES
[facility] - [facility]  Petra Bowden DO  [address], [place] [address]  [phone]         ASSESSMENT AND PLAN    Problem List Items Addressed This Visit          Nervous and Auditory    Seizure disorder (HCC)      Chronic, no seizures since 2013.  Followed by Dr. Basurto in [place].         Relevant Orders    Lipid Panel    Comprehensive Metabolic Panel    CBC with Auto Differential    TSH       Musculoskeletal and Integument    Closed fracture of left radius and ulna     S/P fixation, told to stop PT but does not want to do a repeat surgery that may not help.  Will continue to follow.            Other    BRIE (iron deficiency anemia)      Chronic, maintained by Hematology with iron infusions, will repeat labs today.         Relevant Orders    [facility] Yareli Trammell DO, Gynecology, [place]    Lipid Panel    Comprehensive Metabolic Panel    CBC with Auto Differential    TSH    Ferritin    Iron and TIBC    Menorrhagia with irregular cycle - Primary      Chronic, still with heavy, longer periods with spotting between despite using Provera.  Asks for a second Ob/Gyn opinion, will refer to Dr. Ramos at [facility].         Relevant Orders    [facility] Yareli Trammell DO, Gynecology, [place]    Lipid Panel    Comprehensive Metabolic Panel    CBC with Auto Differential    TSH     Other Visit Diagnoses       Screening mammogram for breast cancer        Relevant Orders    NURIA DIGITAL SCREEN W OR WO CAD BILATERAL    Screening for colon cancer        Relevant Orders    [facility] Gastroenterology    Screening for lipid disorders        Relevant Orders    Lipid Panel    Screening for thyroid disorder        Relevant Orders    TSH             The diagnoses and plan were discussed with the patient, who verbalizes understanding and agrees with plan.  All questions answered.    Chief Complaint    Chief Complaint   Patient presents with    6 Month Follow-Up    Other

## 2024-11-14 DIAGNOSIS — D50.9 IRON DEFICIENCY ANEMIA, UNSPECIFIED IRON DEFICIENCY ANEMIA TYPE: ICD-10-CM

## 2024-11-14 DIAGNOSIS — D50.0 IRON DEFICIENCY ANEMIA DUE TO CHRONIC BLOOD LOSS: Primary | ICD-10-CM

## 2024-11-19 ENCOUNTER — HOSPITAL ENCOUNTER (OUTPATIENT)
Dept: LAB | Age: 54
Discharge: HOME | End: 2024-11-19
Payer: COMMERCIAL

## 2024-11-19 ENCOUNTER — OFFICE VISIT (OUTPATIENT)
Dept: ONCOLOGY | Age: 54
End: 2024-11-19
Payer: COMMERCIAL

## 2024-11-19 VITALS
SYSTOLIC BLOOD PRESSURE: 139 MMHG | HEIGHT: 71 IN | WEIGHT: 281 LBS | BODY MASS INDEX: 39.34 KG/M2 | TEMPERATURE: 98.6 F | DIASTOLIC BLOOD PRESSURE: 88 MMHG | OXYGEN SATURATION: 96 % | HEART RATE: 77 BPM | RESPIRATION RATE: 12 BRPM

## 2024-11-19 DIAGNOSIS — D50.0 IRON DEFICIENCY ANEMIA DUE TO CHRONIC BLOOD LOSS: ICD-10-CM

## 2024-11-19 DIAGNOSIS — D50.9 IRON DEFICIENCY ANEMIA, UNSPECIFIED IRON DEFICIENCY ANEMIA TYPE: ICD-10-CM

## 2024-11-19 DIAGNOSIS — D50.0 IRON DEFICIENCY ANEMIA DUE TO CHRONIC BLOOD LOSS: Primary | ICD-10-CM

## 2024-11-19 DIAGNOSIS — Z86.718 HISTORY OF DEEP VEIN THROMBOSIS OF LOWER EXTREMITY: ICD-10-CM

## 2024-11-19 LAB
ALBUMIN SERPL-MCNC: 3.5 G/DL (ref 3.5–5)
ALBUMIN/GLOB SERPL: 0.9 (ref 1–1.9)
ALP SERPL-CCNC: 84 U/L (ref 35–104)
ALT SERPL-CCNC: 20 U/L (ref 8–45)
ANION GAP SERPL CALC-SCNC: 9 MMOL/L (ref 7–16)
AST SERPL-CCNC: 23 U/L (ref 15–37)
BASOPHILS # BLD: 0 K/UL (ref 0–0.2)
BASOPHILS NFR BLD: 0 % (ref 0–2)
BILIRUB SERPL-MCNC: 0.3 MG/DL (ref 0–1.2)
BUN SERPL-MCNC: 11 MG/DL (ref 6–23)
CALCIUM SERPL-MCNC: 9.5 MG/DL (ref 8.8–10.2)
CHLORIDE SERPL-SCNC: 105 MMOL/L (ref 98–107)
CO2 SERPL-SCNC: 25 MMOL/L (ref 20–29)
CREAT SERPL-MCNC: 0.87 MG/DL (ref 0.6–1.1)
DIFFERENTIAL METHOD BLD: NORMAL
EOSINOPHIL # BLD: 0.1 K/UL (ref 0–0.8)
EOSINOPHIL NFR BLD: 1 % (ref 0.5–7.8)
ERYTHROCYTE [DISTWIDTH] IN BLOOD BY AUTOMATED COUNT: 13.4 % (ref 11.9–14.6)
FERRITIN SERPL-MCNC: 18 NG/ML (ref 8–388)
GLOBULIN SER CALC-MCNC: 4 G/DL (ref 2.3–3.5)
GLUCOSE SERPL-MCNC: 86 MG/DL (ref 70–99)
HCT VFR BLD AUTO: 40.4 % (ref 35.8–46.3)
HGB BLD-MCNC: 12.9 G/DL (ref 11.7–15.4)
IMM GRANULOCYTES # BLD AUTO: 0 K/UL (ref 0–0.5)
IMM GRANULOCYTES NFR BLD AUTO: 0 % (ref 0–5)
IRON SATN MFR SERPL: 14 % (ref 20–50)
IRON SERPL-MCNC: 38 UG/DL (ref 35–100)
LYMPHOCYTES # BLD: 1.7 K/UL (ref 0.5–4.6)
LYMPHOCYTES NFR BLD: 20 % (ref 13–44)
MCH RBC QN AUTO: 29.1 PG (ref 26.1–32.9)
MCHC RBC AUTO-ENTMCNC: 31.9 G/DL (ref 31.4–35)
MCV RBC AUTO: 91.2 FL (ref 82–102)
MONOCYTES # BLD: 0.7 K/UL (ref 0.1–1.3)
MONOCYTES NFR BLD: 8 % (ref 4–12)
NEUTS SEG # BLD: 6.1 K/UL (ref 1.7–8.2)
NEUTS SEG NFR BLD: 71 % (ref 43–78)
NRBC # BLD: 0 K/UL (ref 0–0.2)
PLATELET # BLD AUTO: 240 K/UL (ref 150–450)
PMV BLD AUTO: 10.7 FL (ref 9.4–12.3)
POTASSIUM SERPL-SCNC: 3.6 MMOL/L (ref 3.5–5.1)
PROT SERPL-MCNC: 7.6 G/DL (ref 6.3–8.2)
RBC # BLD AUTO: 4.43 M/UL (ref 4.05–5.2)
SODIUM SERPL-SCNC: 139 MMOL/L (ref 136–145)
TIBC SERPL-MCNC: 268 UG/DL (ref 240–450)
UIBC SERPL-MCNC: 230 UG/DL (ref 112–347)
WBC # BLD AUTO: 8.6 K/UL (ref 4.3–11.1)

## 2024-11-19 PROCEDURE — 80053 COMPREHEN METABOLIC PANEL: CPT

## 2024-11-19 PROCEDURE — 36415 COLL VENOUS BLD VENIPUNCTURE: CPT

## 2024-11-19 PROCEDURE — 83540 ASSAY OF IRON: CPT

## 2024-11-19 PROCEDURE — 99214 OFFICE O/P EST MOD 30 MIN: CPT | Performed by: INTERNAL MEDICINE

## 2024-11-19 PROCEDURE — 83550 IRON BINDING TEST: CPT

## 2024-11-19 PROCEDURE — 85025 COMPLETE CBC W/AUTO DIFF WBC: CPT

## 2024-11-19 PROCEDURE — 82728 ASSAY OF FERRITIN: CPT

## 2024-11-19 ASSESSMENT — PATIENT HEALTH QUESTIONNAIRE - PHQ9
1. LITTLE INTEREST OR PLEASURE IN DOING THINGS: NOT AT ALL
SUM OF ALL RESPONSES TO PHQ QUESTIONS 1-9: 0
SUM OF ALL RESPONSES TO PHQ QUESTIONS 1-9: 0
2. FEELING DOWN, DEPRESSED OR HOPELESS: NOT AT ALL
SUM OF ALL RESPONSES TO PHQ9 QUESTIONS 1 & 2: 0
SUM OF ALL RESPONSES TO PHQ QUESTIONS 1-9: 0
SUM OF ALL RESPONSES TO PHQ QUESTIONS 1-9: 0

## 2024-11-19 NOTE — PROGRESS NOTES
again, if her menorrhagia is slowing then she should have less need for IV replacement going forward.  All questions were asked and answered to the best of my ability.  We will plan to repeat labs in 6 months or sooner if needed.           Bobo Del Cid MD   [facility] Hematology and Oncology  [address] [place] [address]  Office : [phone]  Fax : [phone]

## 2024-11-19 NOTE — PATIENT INSTRUCTIONS
Patient Information from Today's Visit    Diagnosis: Hx DVT; Anemia      Follow Up Instructions: 6 months      Treatment Summary has been discussed and given to patient: N/A      Current Labs:   Hospital Outpatient Visit on 11/19/2024   Component Date Value Ref Range Status    WBC 11/19/2024 8.6  4.3 - 11.1 K/uL Final    RBC 11/19/2024 4.43  4.05 - 5.2 M/uL Final    Hemoglobin 11/19/2024 12.9  11.7 - 15.4 g/dL Final    Hematocrit 11/19/2024 40.4  35.8 - 46.3 % Final    MCV 11/19/2024 91.2  82.0 - 102.0 FL Final    MCH 11/19/2024 29.1  26.1 - 32.9 PG Final    MCHC 11/19/2024 31.9  31.4 - 35.0 g/dL Final    RDW 11/19/2024 13.4  11.9 - 14.6 % Final    Platelets 11/19/2024 240  150 - 450 K/uL Final    MPV 11/19/2024 10.7  9.4 - 12.3 FL Final    nRBC 11/19/2024 0.00  0.0 - 0.2 K/uL Final    **Note: Absolute NRBC parameter is now reported with Hemogram**    Neutrophils % 11/19/2024 71  43 - 78 % Final    Lymphocytes % 11/19/2024 20  13 - 44 % Final    Monocytes % 11/19/2024 8  4.0 - 12.0 % Final    Eosinophils % 11/19/2024 1  0.5 - 7.8 % Final    Basophils % 11/19/2024 0  0.0 - 2.0 % Final    Immature Granulocytes % 11/19/2024 0  0.0 - 5.0 % Final    Neutrophils Absolute 11/19/2024 6.1  1.7 - 8.2 K/UL Final    Lymphocytes Absolute 11/19/2024 1.7  0.5 - 4.6 K/UL Final    Monocytes Absolute 11/19/2024 0.7  0.1 - 1.3 K/UL Final    Eosinophils Absolute 11/19/2024 0.1  0.0 - 0.8 K/UL Final    Basophils Absolute 11/19/2024 0.0  0.0 - 0.2 K/UL Final    Immature Granulocytes Absolute 11/19/2024 0.0  0.0 - 0.5 K/UL Final    Differential Type 11/19/2024 AUTOMATED    Final                 Please refer to After Visit Summary or CME for upcoming appointment information. If you have any questions regarding your upcoming schedule please reach out to your care team through CME or call [phone].    Please notify your assigned Nurse Navigator of any unplanned hospital admissions or Emergency Room visits within 24 hours of

## 2025-01-29 NOTE — PROGRESS NOTES
Maryanne  is a 54 y.o. female, No obstetric history on file..  Patient's last menstrual period was 01/26/2025 (approximate)., who is being seen for menorrhagia with irregular cycle. Pt has hx of fibroids Pt previously seen by Dr. Jones at [facility]: previously rx'ed Provera  01/2024, monitored Iron. Pt reports Provera has lightened bleeding but cycles have not regulated. Pt getting Iron infusions regularly, last appt did not need infusion per Dr. Del Cid. Pt reports she is spotting currently. Hg 12.9 in November.   She is worried that the fibroids could change.  Her last us was January.  She has been off of anticoagulant about 6-8 months.    Mammogram last 2022.  Has not had a pap in a bit.  Had endo biopsy in June.  Never had children.  Medically complex.  Her cycle is lighter but may last 3 weeks.  Taking the provera.  Discussed that average age of menopause is 51.  1:1000 chance that fibroids can undergo cancerous change - become a sarcoma.    US Findings 01/24/2024:  Enlarged uterus containing multiple uterine fibroids   The uterus measures 17.3 x 11.5 cm and demonstrates multiple uterine  fibroids are noted. Largest in the left measures 6.5 x 5.7 x 7.1 cm. There is a  exophytic fibroid that measures 8.4 x 5.3 x 7.2 cm. Largest right fibroid  measures 5.5 x 6.1 x 4.8 cm.        Ferritin levels:  11/19/24: 18  11/13/24: 18  7/22/24: 8  4/16/24: 25  2/7/24: 11  1/24/24: 5    HISTORY:    OB History    No obstetric history on file.       GYN History         Sexual History:   Social History     Substance and Sexual Activity   Sexual Activity Not Currently    Birth control/protection: None          has a past medical history of DVT (deep venous thrombosis) (HCC), Epilepsy (HCC), Heart attack (HCC), and History of brain surgery. .    Past Surgical History:   Procedure Laterality Date    ORTHOPEDIC SURGERY Left     wrist/elbow    OTHER SURGICAL HISTORY  2013    Brain - for seizures (Epilepsy)       Her

## 2025-01-30 ENCOUNTER — OFFICE VISIT (OUTPATIENT)
Dept: OBGYN CLINIC | Age: 55
End: 2025-01-30

## 2025-01-30 VITALS — WEIGHT: 285.8 LBS | BODY MASS INDEX: 40.01 KG/M2 | HEIGHT: 71 IN

## 2025-01-30 DIAGNOSIS — D25.9 UTERINE LEIOMYOMA, UNSPECIFIED LOCATION: ICD-10-CM

## 2025-01-30 DIAGNOSIS — N93.9 ABNORMAL UTERINE BLEEDING: Primary | ICD-10-CM

## 2025-02-19 ENCOUNTER — OFFICE VISIT (OUTPATIENT)
Age: 55
End: 2025-02-19

## 2025-02-19 VITALS
WEIGHT: 290 LBS | HEIGHT: 72 IN | BODY MASS INDEX: 39.28 KG/M2 | SYSTOLIC BLOOD PRESSURE: 138 MMHG | HEART RATE: 76 BPM | DIASTOLIC BLOOD PRESSURE: 81 MMHG

## 2025-02-19 DIAGNOSIS — D25.9 UTERINE LEIOMYOMA, UNSPECIFIED LOCATION: Primary | ICD-10-CM

## 2025-02-19 DIAGNOSIS — N92.1 MENORRHAGIA WITH IRREGULAR CYCLE: ICD-10-CM

## 2025-02-19 DIAGNOSIS — Z86.2 HISTORY OF IRON DEFICIENCY ANEMIA: ICD-10-CM

## 2025-02-20 DIAGNOSIS — D25.9 UTERINE LEIOMYOMA, UNSPECIFIED LOCATION: Primary | ICD-10-CM

## 2025-02-20 DIAGNOSIS — Z86.2 HISTORY OF IRON DEFICIENCY ANEMIA: ICD-10-CM

## 2025-02-20 DIAGNOSIS — N92.1 MENORRHAGIA WITH IRREGULAR CYCLE: ICD-10-CM

## 2025-02-27 NOTE — PROGRESS NOTES
Uterine Volume: 1309.816 cm³       HISTORY:    OB History    No obstetric history on file.       GYN History         Sexual History:   Social History     Substance and Sexual Activity   Sexual Activity Not Currently    Birth control/protection: None          has a past medical history of DVT (deep venous thrombosis) (HCC), Epilepsy (HCC), Heart attack (HCC), and History of brain surgery. .    Past Surgical History:   Procedure Laterality Date    ORTHOPEDIC SURGERY Left     wrist/elbow    OTHER SURGICAL HISTORY  2013    Brain - for seizures (Epilepsy)       Her current meds are:    Current Outpatient Medications:     hydrOXYzine HCl (ATARAX) 10 MG tablet, TAKE 1 TO 2 TABLETS TWICE DAILY AS NEEDED FOR ANXIETY OR SLEEP, Disp: , Rfl:     medroxyPROGESTERone (PROVERA) 10 MG tablet, Take 1 tablet by mouth daily, Disp: 90 tablet, Rfl: 1    acetaminophen (TYLENOL) 325 MG tablet, Take 2 tablets by mouth every 6 hours as needed, Disp: , Rfl:     ferrous sulfate 324 (65 Fe) MG EC tablet, Take 325 mg by mouth daily, Disp: , Rfl:     Multiple Vitamins-Minerals (MULTIVITAL PO), Take by mouth, Disp: , Rfl:     Cholecalciferol (VITAMIN D3) 50 MCG (2000 UT) CAPS, Take by mouth, Disp: , Rfl:     EPINEPHrine (EPIPEN) 0.3 MG/0.3ML SOAJ injection, , Disp: , Rfl:      Review of Systems  Neg except hpi     PHYSICAL EXAM:  /86   Ht 1.829 m (6')   Wt 129.6 kg (285 lb 12.8 oz)   LMP 02/19/2025 (Exact Date)   BMI 38.76 kg/m²   Physical Exam  General: well nourished well developed female in nad     ASSESSMENT / PLAN:  Maryanne was seen today for ultrasound.    Diagnoses and all orders for this visit:    Abnormal uterine bleeding (AUB)    Uterine leiomyoma, unspecified location      Wished her luck with uae and rtc if needed.      Yareli Ramos, DO

## 2025-02-28 ENCOUNTER — PROCEDURE VISIT (OUTPATIENT)
Dept: OBGYN CLINIC | Age: 55
End: 2025-02-28

## 2025-02-28 ENCOUNTER — OFFICE VISIT (OUTPATIENT)
Dept: OBGYN CLINIC | Age: 55
End: 2025-02-28

## 2025-02-28 VITALS
SYSTOLIC BLOOD PRESSURE: 128 MMHG | DIASTOLIC BLOOD PRESSURE: 86 MMHG | BODY MASS INDEX: 38.71 KG/M2 | WEIGHT: 285.8 LBS | HEIGHT: 72 IN

## 2025-02-28 DIAGNOSIS — N93.9 ABNORMAL UTERINE BLEEDING (AUB): Primary | ICD-10-CM

## 2025-02-28 DIAGNOSIS — D25.9 UTERINE LEIOMYOMA, UNSPECIFIED LOCATION: ICD-10-CM

## 2025-02-28 DIAGNOSIS — N93.9 ABNORMAL UTERINE BLEEDING: Primary | ICD-10-CM

## 2025-02-28 RX ORDER — HYDROXYZINE HYDROCHLORIDE 10 MG/1
TABLET, FILM COATED ORAL
COMMUNITY
Start: 2024-12-27

## 2025-03-06 ENCOUNTER — PATIENT MESSAGE (OUTPATIENT)
Dept: PRIMARY CARE CLINIC | Facility: CLINIC | Age: 55
End: 2025-03-06

## 2025-03-09 RX ORDER — EPINEPHRINE 0.3 MG/.3ML
INJECTION SUBCUTANEOUS
Qty: 2 EACH | Refills: 0 | Status: SHIPPED | OUTPATIENT
Start: 2025-03-09

## 2025-03-28 ENCOUNTER — OFFICE VISIT (OUTPATIENT)
Dept: ONCOLOGY | Age: 55
End: 2025-03-28
Payer: MEDICARE

## 2025-03-28 ENCOUNTER — HOSPITAL ENCOUNTER (OUTPATIENT)
Dept: LAB | Age: 55
Discharge: HOME | End: 2025-03-28
Payer: MEDICARE

## 2025-03-28 VITALS
HEART RATE: 75 BPM | DIASTOLIC BLOOD PRESSURE: 88 MMHG | OXYGEN SATURATION: 95 % | HEIGHT: 71 IN | SYSTOLIC BLOOD PRESSURE: 132 MMHG | WEIGHT: 287.7 LBS | BODY MASS INDEX: 40.28 KG/M2 | RESPIRATION RATE: 16 BRPM | TEMPERATURE: 98 F

## 2025-03-28 DIAGNOSIS — D50.0 IRON DEFICIENCY ANEMIA DUE TO CHRONIC BLOOD LOSS: ICD-10-CM

## 2025-03-28 DIAGNOSIS — Z86.718 HISTORY OF DEEP VEIN THROMBOSIS OF LOWER EXTREMITY: ICD-10-CM

## 2025-03-28 DIAGNOSIS — D50.0 IRON DEFICIENCY ANEMIA DUE TO CHRONIC BLOOD LOSS: Primary | ICD-10-CM

## 2025-03-28 DIAGNOSIS — R22.42 LOCALIZED SWELLING OF LEFT LOWER EXTREMITY: ICD-10-CM

## 2025-03-28 LAB
ALBUMIN SERPL-MCNC: 3.6 G/DL (ref 3.5–5)
ALBUMIN/GLOB SERPL: 0.8 (ref 1–1.9)
ALP SERPL-CCNC: 82 U/L (ref 35–104)
ALT SERPL-CCNC: 11 U/L (ref 8–45)
ANION GAP SERPL CALC-SCNC: 12 MMOL/L (ref 7–16)
AST SERPL-CCNC: 17 U/L (ref 15–37)
BASOPHILS # BLD: 0.02 K/UL (ref 0–0.2)
BASOPHILS NFR BLD: 0.3 % (ref 0–2)
BILIRUB SERPL-MCNC: 0.4 MG/DL (ref 0–1.2)
BUN SERPL-MCNC: 8 MG/DL (ref 6–23)
CALCIUM SERPL-MCNC: 9.2 MG/DL (ref 8.8–10.2)
CHLORIDE SERPL-SCNC: 106 MMOL/L (ref 98–107)
CO2 SERPL-SCNC: 22 MMOL/L (ref 20–29)
CREAT SERPL-MCNC: 0.8 MG/DL (ref 0.6–1.1)
DIFFERENTIAL METHOD BLD: NORMAL
EOSINOPHIL # BLD: 0.09 K/UL (ref 0–0.8)
EOSINOPHIL NFR BLD: 1.4 % (ref 0.5–7.8)
ERYTHROCYTE [DISTWIDTH] IN BLOOD BY AUTOMATED COUNT: 14.3 % (ref 11.9–14.6)
FERRITIN SERPL-MCNC: 11 NG/ML (ref 8–388)
GLOBULIN SER CALC-MCNC: 4.3 G/DL (ref 2.3–3.5)
GLUCOSE SERPL-MCNC: 94 MG/DL (ref 70–99)
HCT VFR BLD AUTO: 39.3 % (ref 35.8–46.3)
HGB BLD-MCNC: 12.7 G/DL (ref 11.7–15.4)
IMM GRANULOCYTES # BLD AUTO: 0.02 K/UL (ref 0–0.5)
IMM GRANULOCYTES NFR BLD AUTO: 0.3 % (ref 0–5)
IRON SATN MFR SERPL: 21 % (ref 20–50)
IRON SERPL-MCNC: 61 UG/DL (ref 35–100)
LYMPHOCYTES # BLD: 1.18 K/UL (ref 0.5–4.6)
LYMPHOCYTES NFR BLD: 18.2 % (ref 13–44)
MCH RBC QN AUTO: 28.6 PG (ref 26.1–32.9)
MCHC RBC AUTO-ENTMCNC: 32.3 G/DL (ref 31.4–35)
MCV RBC AUTO: 88.5 FL (ref 82–102)
MONOCYTES # BLD: 0.46 K/UL (ref 0.1–1.3)
MONOCYTES NFR BLD: 7.1 % (ref 4–12)
NEUTS SEG # BLD: 4.7 K/UL (ref 1.7–8.2)
NEUTS SEG NFR BLD: 72.7 % (ref 43–78)
NRBC # BLD: 0 K/UL (ref 0–0.2)
PLATELET # BLD AUTO: 248 K/UL (ref 150–450)
PMV BLD AUTO: 10.6 FL (ref 9.4–12.3)
POTASSIUM SERPL-SCNC: 3.8 MMOL/L (ref 3.5–5.1)
PROT SERPL-MCNC: 7.9 G/DL (ref 6.3–8.2)
RBC # BLD AUTO: 4.44 M/UL (ref 4.05–5.2)
SODIUM SERPL-SCNC: 140 MMOL/L (ref 136–145)
TIBC SERPL-MCNC: 297 UG/DL (ref 240–450)
UIBC SERPL-MCNC: 236 UG/DL (ref 112–347)
WBC # BLD AUTO: 6.5 K/UL (ref 4.3–11.1)

## 2025-03-28 PROCEDURE — 80053 COMPREHEN METABOLIC PANEL: CPT

## 2025-03-28 PROCEDURE — 36415 COLL VENOUS BLD VENIPUNCTURE: CPT

## 2025-03-28 PROCEDURE — 82728 ASSAY OF FERRITIN: CPT

## 2025-03-28 PROCEDURE — 99214 OFFICE O/P EST MOD 30 MIN: CPT | Performed by: NURSE PRACTITIONER

## 2025-03-28 PROCEDURE — 85025 COMPLETE CBC W/AUTO DIFF WBC: CPT

## 2025-03-28 PROCEDURE — 83550 IRON BINDING TEST: CPT

## 2025-03-28 PROCEDURE — 83540 ASSAY OF IRON: CPT

## 2025-03-28 ASSESSMENT — PATIENT HEALTH QUESTIONNAIRE - PHQ9
2. FEELING DOWN, DEPRESSED OR HOPELESS: NOT AT ALL
SUM OF ALL RESPONSES TO PHQ QUESTIONS 1-9: 0
1. LITTLE INTEREST OR PLEASURE IN DOING THINGS: NOT AT ALL
SUM OF ALL RESPONSES TO PHQ QUESTIONS 1-9: 0

## 2025-03-28 NOTE — PROGRESS NOTES
today as a work in.  She requested sooner follow up d/t dizziness which has occurred intermittently over the last week.  She has seen her GYN about 1 month ago and she recommended uterine fibroid embolization.  She continues to have HMB which is variable.  She denies any other known bleeding.  She continues on oral iron taking once daily and tolerating well.  She normally does not have LLE swelling, but yesterday after work her LLE was quite swollen.  Her swelling did improve with elevation and is nearly normal today.  She admits that she has been drinking caffeine and not hydrating with other fluids.  Labs reviewed and very stable, Hgb 12.7, TSAT 21%, no indication for IV iron at this time.  She will c/w oral iron as planned.  Due to hx of blood clot and LLE swelling, we will arrange for US of the LLE to r/o DVT.  She will try to hydrate well orally over the weekend and limit caffeine.  If her dizziness is persistent, she will contact her PCP office on Monday for further workup (as not r/t anemia).  She will FU next in July 2025 or sooner as needed.             NEGRA Robles - NP   [facility]  [address] [place] [address]  Office : [phone]  Fax : [phone]

## 2025-04-01 ENCOUNTER — HOSPITAL ENCOUNTER (OUTPATIENT)
Dept: ULTRASOUND IMAGING | Age: 55
Discharge: HOME | End: 2025-04-03
Payer: MEDICARE

## 2025-04-01 ENCOUNTER — RESULTS FOLLOW-UP (OUTPATIENT)
Dept: ONCOLOGY | Age: 55
End: 2025-04-01

## 2025-04-01 DIAGNOSIS — Z86.718 HISTORY OF DEEP VEIN THROMBOSIS OF LOWER EXTREMITY: ICD-10-CM

## 2025-04-01 DIAGNOSIS — R22.42 LOCALIZED SWELLING OF LEFT LOWER EXTREMITY: ICD-10-CM

## 2025-04-01 PROCEDURE — 93971 EXTREMITY STUDY: CPT

## 2025-04-01 PROCEDURE — 93971 EXTREMITY STUDY: CPT | Performed by: RADIOLOGY

## 2025-04-28 RX ORDER — MEDROXYPROGESTERONE ACETATE 10 MG
10 TABLET ORAL DAILY
Qty: 90 TABLET | Refills: 1 | OUTPATIENT
Start: 2025-04-28

## 2025-04-28 RX ORDER — MEDROXYPROGESTERONE ACETATE 10 MG
10 TABLET ORAL DAILY
Qty: 90 TABLET | Refills: 1 | Status: SHIPPED | OUTPATIENT
Start: 2025-04-28

## 2025-05-12 SDOH — ECONOMIC STABILITY: TRANSPORTATION INSECURITY
IN THE PAST 12 MONTHS, HAS LACK OF TRANSPORTATION KEPT YOU FROM MEETINGS, WORK, OR FROM GETTING THINGS NEEDED FOR DAILY LIVING?: PATIENT DECLINED

## 2025-05-12 SDOH — ECONOMIC STABILITY: FOOD INSECURITY: WITHIN THE PAST 12 MONTHS, YOU WORRIED THAT YOUR FOOD WOULD RUN OUT BEFORE YOU GOT MONEY TO BUY MORE.: PATIENT DECLINED

## 2025-05-12 SDOH — ECONOMIC STABILITY: INCOME INSECURITY: IN THE LAST 12 MONTHS, WAS THERE A TIME WHEN YOU WERE NOT ABLE TO PAY THE MORTGAGE OR RENT ON TIME?: PATIENT DECLINED

## 2025-05-12 SDOH — ECONOMIC STABILITY: TRANSPORTATION INSECURITY
IN THE PAST 12 MONTHS, HAS THE LACK OF TRANSPORTATION KEPT YOU FROM MEDICAL APPOINTMENTS OR FROM GETTING MEDICATIONS?: PATIENT DECLINED

## 2025-05-12 SDOH — ECONOMIC STABILITY: FOOD INSECURITY: WITHIN THE PAST 12 MONTHS, THE FOOD YOU BOUGHT JUST DIDN'T LAST AND YOU DIDN'T HAVE MONEY TO GET MORE.: PATIENT DECLINED

## 2025-05-13 ENCOUNTER — OFFICE VISIT (OUTPATIENT)
Dept: PRIMARY CARE CLINIC | Facility: CLINIC | Age: 55
End: 2025-05-13
Payer: MEDICARE

## 2025-05-13 VITALS
HEART RATE: 82 BPM | DIASTOLIC BLOOD PRESSURE: 86 MMHG | TEMPERATURE: 97.8 F | OXYGEN SATURATION: 96 % | HEIGHT: 71 IN | WEIGHT: 293 LBS | BODY MASS INDEX: 41.02 KG/M2 | SYSTOLIC BLOOD PRESSURE: 132 MMHG

## 2025-05-13 DIAGNOSIS — N92.1 MENORRHAGIA WITH IRREGULAR CYCLE: ICD-10-CM

## 2025-05-13 DIAGNOSIS — E66.01 SEVERE OBESITY (HCC): ICD-10-CM

## 2025-05-13 DIAGNOSIS — R06.83 SNORING: ICD-10-CM

## 2025-05-13 DIAGNOSIS — D50.9 IRON DEFICIENCY ANEMIA, UNSPECIFIED IRON DEFICIENCY ANEMIA TYPE: Primary | ICD-10-CM

## 2025-05-13 PROCEDURE — 99213 OFFICE O/P EST LOW 20 MIN: CPT | Performed by: FAMILY MEDICINE

## 2025-05-13 ASSESSMENT — PATIENT HEALTH QUESTIONNAIRE - PHQ9
SUM OF ALL RESPONSES TO PHQ QUESTIONS 1-9: 0
1. LITTLE INTEREST OR PLEASURE IN DOING THINGS: NOT AT ALL
SUM OF ALL RESPONSES TO PHQ QUESTIONS 1-9: 0
2. FEELING DOWN, DEPRESSED OR HOPELESS: NOT AT ALL
SUM OF ALL RESPONSES TO PHQ QUESTIONS 1-9: 0
SUM OF ALL RESPONSES TO PHQ QUESTIONS 1-9: 0

## 2025-05-13 ASSESSMENT — ENCOUNTER SYMPTOMS
NAUSEA: 0
DIARRHEA: 0
SHORTNESS OF BREATH: 0
VOMITING: 0
ABDOMINAL PAIN: 0
COUGH: 0

## 2025-05-13 NOTE — ASSESSMENT & PLAN NOTE
Chronic, stable with recent hemoglobin within normal limits.  Has done well with iron infusions.  Scheduled for hysterectomy next month which will hopefully resolve her anemia issues.

## 2025-05-13 NOTE — ASSESSMENT & PLAN NOTE
Wt Readings from Last 3 Encounters:   05/13/25 132.9 kg (293 lb)   03/28/25 130.5 kg (287 lb 11.2 oz)   02/28/25 129.6 kg (285 lb 12.8 oz)     Chronic, not at goal.  Patient continues to gain weight despite not eating much.  Admits that she is not very active, scheduled for hysterectomy next month.  Plan to repeat labs and discuss other options to help with weight loss when she returns in 3 months.

## 2025-05-13 NOTE — PROGRESS NOTES
[facility]  Petra Cortez Kal, DO  [address], [place] [address] [phone]         ASSESSMENT AND PLAN    Problem List Items Addressed This Visit          Other    Severe obesity (HCC)       Wt Readings from Last 3 Encounters:   05/13/25 132.9 kg (293 lb)   03/28/25 130.5 kg (287 lb 11.2 oz)   02/28/25 129.6 kg (285 lb 12.8 oz)     Chronic, not at goal.  Patient continues to gain weight despite not eating much.  Admits that she is not very active, scheduled for hysterectomy next month.  Plan to repeat labs and discuss other options to help with weight loss when she returns in 3 months.         BRIE (iron deficiency anemia) - Primary    Chronic, stable with recent hemoglobin within normal limits.  Has done well with iron infusions.  Scheduled for hysterectomy next month which will hopefully resolve her anemia issues.         Menorrhagia with irregular cycle     Chronic issue, some improvement with Provera but scheduled for hysterectomy due to multiple fibroids next month.         Snoring     Chronic issue, improved in the past with weight loss but has gotten worse recently.  Planning to hold on sleep study for now until after her surgery.  Will recheck in 3 months.             The diagnoses and plan were discussed with the patient, who verbalizes understanding and agrees with plan.  All questions answered.    Chief Complaint    Chief Complaint   Patient presents with    6 Month Follow-Up    Other     Uterine surgery is scheduled next month          HISTORY OF PRESENT ILLNESS    54 y.o. female presents today for follow up.  Last seen six months ago, had labs checked and discussed menorrhagia, referring her to Ob/Gyn.  States that her Ob/Gyn has scheduled her for a hysterectomy, states that her bleeding is better on the Provera but will bleed for weeks.  Notes that her Hematology wants her to have surgery as this is the cause of her iron deficiency anemia

## 2025-05-13 NOTE — ASSESSMENT & PLAN NOTE
Chronic issue, some improvement with Provera but scheduled for hysterectomy due to multiple fibroids next month.

## 2025-05-13 NOTE — ASSESSMENT & PLAN NOTE
Chronic issue, improved in the past with weight loss but has gotten worse recently.  Planning to hold on sleep study for now until after her surgery.  Will recheck in 3 months.

## 2025-06-09 ENCOUNTER — OFFICE VISIT (OUTPATIENT)
Dept: PRIMARY CARE CLINIC | Facility: CLINIC | Age: 55
End: 2025-06-09
Payer: MEDICARE

## 2025-06-09 VITALS
DIASTOLIC BLOOD PRESSURE: 80 MMHG | WEIGHT: 288 LBS | OXYGEN SATURATION: 99 % | TEMPERATURE: 97.9 F | HEIGHT: 70 IN | BODY MASS INDEX: 41.23 KG/M2 | SYSTOLIC BLOOD PRESSURE: 130 MMHG | HEART RATE: 80 BPM

## 2025-06-09 DIAGNOSIS — R21 SKIN ERUPTION: Primary | ICD-10-CM

## 2025-06-09 DIAGNOSIS — R22.31 LOCALIZED SWELLING OF RIGHT UPPER EXTREMITY: ICD-10-CM

## 2025-06-09 PROCEDURE — 99213 OFFICE O/P EST LOW 20 MIN: CPT

## 2025-06-09 RX ORDER — PREDNISONE 5 MG/1
TABLET ORAL
Qty: 21 EACH | Refills: 0 | Status: SHIPPED | OUTPATIENT
Start: 2025-06-09

## 2025-06-09 NOTE — PROGRESS NOTES
[facility]  Lucía \"Nancy\" NAE Reese  [address], [place] [address]  [phone]         ASSESSMENT AND PLAN    Problem List Items Addressed This Visit       Skin eruption - Primary    Relevant Medications    predniSONE 5 MG (21) TBPK       Assessment & Plan  1. Right upper arm swelling and itching: Chronic. Secondary infection is a concern due to significant swelling. Delayed reaction cannot be ruled out.  - Discontinue hydroxyzine.  - Prescription for a 6-day tapering course of steroids to commence, but ask surgeon if okay  - Recommend over-the-counter hydrocortisone cream for application on the affected area for a minimum duration of 1 to 2 weeks. Caution against applying the cream on the face.  - Contact post-surgery if different medication for sleep is required, since hydroxyzine was prescribed for that.    Follow-up  - Message regarding steroid use and surgery.    No orders of the defined types were placed in this encounter.      Orders Placed This Encounter   Medications    predniSONE 5 MG (21) TBPK     Sig: Day 1:  Take 1 Tablet by mouth 6 times, Day 2:  Take 1 Tablet by mouth 5 times, Day 3:  Take 1 Tablet by mouth 4 times, Day 4:  Take 1 Tablet by mouth 3 times, Day 5:  Take 1 Tablet by mouth 2 times, Day 6:  Take 1 Tablet by mouth 1 time then stop     Dispense:  21 each     Refill:  0       No follow-ups on file.    The diagnoses and plan were discussed with the patient, who verbalizes understanding and agrees with plan.  All questions answered.    The patient (or guardian, if applicable) and other individuals in attendance with the patient were advised that Artificial Intelligence will be utilized during this visit to record, process the conversation to generate a clinical note, and support improvement of the AI technology. The patient (or guardian, if applicable) and other individuals in attendance at the appointment consented to the use of AI,

## 2025-06-15 PROBLEM — R22.31 LOCALIZED SWELLING OF RIGHT UPPER EXTREMITY: Status: ACTIVE | Noted: 2025-06-15

## 2025-06-28 ENCOUNTER — HOSPITAL ENCOUNTER (EMERGENCY)
Age: 55
Discharge: HOME | End: 2025-06-28
Attending: EMERGENCY MEDICINE
Payer: MEDICARE

## 2025-06-28 VITALS
WEIGHT: 288 LBS | RESPIRATION RATE: 16 BRPM | HEART RATE: 81 BPM | SYSTOLIC BLOOD PRESSURE: 155 MMHG | TEMPERATURE: 97.5 F | OXYGEN SATURATION: 98 % | BODY MASS INDEX: 41.23 KG/M2 | DIASTOLIC BLOOD PRESSURE: 92 MMHG | HEIGHT: 70 IN

## 2025-06-28 DIAGNOSIS — T78.40XA ALLERGIC REACTION, INITIAL ENCOUNTER: Primary | ICD-10-CM

## 2025-06-28 PROCEDURE — 6360000002 HC RX W HCPCS: Performed by: EMERGENCY MEDICINE

## 2025-06-28 PROCEDURE — 99284 EMERGENCY DEPT VISIT MOD MDM: CPT

## 2025-06-28 PROCEDURE — 2580000003 HC RX 258: Performed by: EMERGENCY MEDICINE

## 2025-06-28 PROCEDURE — 6370000000 HC RX 637 (ALT 250 FOR IP): Performed by: EMERGENCY MEDICINE

## 2025-06-28 PROCEDURE — 96374 THER/PROPH/DIAG INJ IV PUSH: CPT

## 2025-06-28 RX ORDER — DIPHENHYDRAMINE HYDROCHLORIDE 50 MG/ML
25 INJECTION, SOLUTION INTRAMUSCULAR; INTRAVENOUS ONCE
Status: COMPLETED | OUTPATIENT
Start: 2025-06-28 | End: 2025-06-28

## 2025-06-28 RX ORDER — 0.9 % SODIUM CHLORIDE 0.9 %
1000 INTRAVENOUS SOLUTION INTRAVENOUS
Status: COMPLETED | OUTPATIENT
Start: 2025-06-28 | End: 2025-06-28

## 2025-06-28 RX ORDER — EPINEPHRINE 0.3 MG/.3ML
0.3 INJECTION SUBCUTANEOUS PRN
Qty: 2 EACH | Refills: 1 | Status: SHIPPED | OUTPATIENT
Start: 2025-06-28

## 2025-06-28 RX ORDER — FAMOTIDINE 20 MG/1
40 TABLET, FILM COATED ORAL
Status: COMPLETED | OUTPATIENT
Start: 2025-06-28 | End: 2025-06-28

## 2025-06-28 RX ORDER — IPRATROPIUM BROMIDE AND ALBUTEROL SULFATE 2.5; .5 MG/3ML; MG/3ML
1 SOLUTION RESPIRATORY (INHALATION)
Status: DISCONTINUED | OUTPATIENT
Start: 2025-06-28 | End: 2025-06-28 | Stop reason: HOSPADM

## 2025-06-28 RX ADMIN — SODIUM CHLORIDE 1000 ML: 0.9 INJECTION, SOLUTION INTRAVENOUS at 13:56

## 2025-06-28 RX ADMIN — DIPHENHYDRAMINE HYDROCHLORIDE 25 MG: 50 INJECTION INTRAMUSCULAR; INTRAVENOUS at 14:27

## 2025-06-28 RX ADMIN — FAMOTIDINE 40 MG: 20 TABLET, FILM COATED ORAL at 13:56

## 2025-06-28 ASSESSMENT — PAIN SCALES - GENERAL: PAINLEVEL_OUTOF10: 0

## 2025-06-28 NOTE — ED PROVIDER NOTES
Emergency Department Provider Note                   PCP:                Petra Bowden DO               Age: 54 y.o.      Sex: female   Final diagnosis/impression:  No diagnosis found.   Disposition: {Dispo:13258}    MDM/Clinical Course:  Patient seen by myself at the [facility] emergency department. Patient had signs symptoms and clinical history most consistent with []. My independent analysis/interpretation of laboratory work-up here shows []. Radiology shows []. While under my care, patient received [].    Complexity of Problems Addressed:  {Complexity:26286}    Data Reviewed and Analyzed:    Category 1:   {external source:84934}  I ordered each unique test.  I reviewed the results of each unique test.  {Historian (state who, why needed, what they said):07470}    Category 2:   My Independent EKG Interpretation:   {EKG Interpretation:09419}   ST Segments:{ST Segments:09002}   Rate: []  QTC:[]  {EK}  {Rhythm Strip:86700}    Radiology:  My limited/focused independent chest x-ray review shows no pneumothorax, no pneumonia, no cardiomegaly, no aortic widening, no noted focal consolidation. [].   Please see official radiology read for additional information, further findings and official interpretation.     {test reviewed:18597}    Category 3: Discussion of management or test interpretation.  See MDM / clinical course section above for details    Risk of Complications and/or Morbidity of Patient Management:  {Risk:48812}  {Admitted or Consultants involved.:61573}  {Critical Care:58727}  _____    Quality/Sepsis:    {MIPS URI - Strep - Sinusitis - Pregnant - Head Trauma - Overdose - Agitation:25279}  {SEP1 yes/no:97815}    Orders/Meds:    No orders of the defined types were placed in this encounter.     ED Meds Given:  Medications - No data to display  New Prescriptions    No medications on file         ___________________  HPI: Maryanne Modi is a 54 y.o. female with []. [].  To some extent   Social Connections: Moderately Isolated (4/3/2025)    Received from [facility]    Social Connection and Isolation Panel [NHANES]     Frequency of Communication with Friends and Family: Never     Frequency of Social Gatherings with Friends and Family: Never     Attends Congregation Services: 1 to 4 times per year     Active Member of Clubs or Organizations: No     Attends Club or Organization Meetings: 1 to 4 times per year     Marital Status: Never    Intimate Partner Violence: Not At Risk (6/13/2025)    Received from [facility]    Abuse Screen     Feels Unsafe at Home or Work/School: no     Feels Threatened by Someone: no     Does Anyone Try to Keep You From Having Contact with Others or Doing Things Outside Your Home?: no     Physical Signs of Abuse Present: no   Housing Stability: Patient Declined (5/12/2025)    Housing Stability Vital Sign     Unable to Pay for Housing in the Last Year: Patient declined     Number of Times Moved in the Last Year: 0     Homeless in the Last Year: Patient declined     Medications:   Previous Medications    ACETAMINOPHEN (TYLENOL) 325 MG TABLET    Take 2 tablets by mouth every 6 hours as needed    CHOLECALCIFEROL (VITAMIN D3) 50 MCG (2000 UT) CAPS    Take by mouth    EPINEPHRINE (EPIPEN) 0.3 MG/0.3ML SOAJ INJECTION    Use as directed for allergic reaction    FERROUS SULFATE 324 (65 FE) MG EC TABLET    Take 325 mg by mouth daily    MEDROXYPROGESTERONE (PROVERA) 10 MG TABLET    Take 1 tablet by mouth daily    MULTIPLE VITAMINS-MINERALS (MULTIVITAL PO)    Take by mouth    PREDNISONE 5 MG (21) TBPK    Day 1:  Take 1 Tablet by mouth 6 times, Day 2:  Take 1 Tablet by mouth 5 times, Day 3:  Take 1 Tablet by mouth 4 times, Day 4:  Take 1 Tablet by mouth 3 times, Day 5:  Take 1 Tablet by mouth 2 times, Day 6:  Take 1 Tablet by mouth 1 time then stop      Allergies:   Allergies   Allergen Reactions    Decadron

## 2025-06-28 NOTE — ED TRIAGE NOTES
Pt arrives via [facility] coming from [facility] urgent care after being sent here. Pt was bit by insect recently and now has swelling to lips, throat. 0.3 epi and 50 IM benadryl

## 2025-06-28 NOTE — DISCHARGE INSTRUCTIONS
As discussed we recommend taking Benadryl every 4-6 hours as needed for itching/rash, you may also take Pepcid daily over the next 3 to 5 days.    Return as needed for any questions, concerns or worsening symptoms, particularly shortness of breath/wheezing symptoms, recurrent/worsening rash, swelling of your lips, tongue's or face that is worsening, difficulty breathing or swallowing, increasing chest tightness.

## 2025-06-28 NOTE — ED NOTES
Patient mobility status ambulates with no difficulty.     I have reviewed discharge instructions with the patient.  The patient verbalized understanding.    Patient left ED via Discharge Method: ambulatory to Home with family.    Opportunity for questions and clarification provided.     Patient given 1 scripts.

## 2025-06-30 ENCOUNTER — PATIENT MESSAGE (OUTPATIENT)
Dept: PRIMARY CARE CLINIC | Facility: CLINIC | Age: 55
End: 2025-06-30

## 2025-07-07 ENCOUNTER — COMMUNITY OUTREACH (OUTPATIENT)
Dept: PRIMARY CARE CLINIC | Facility: CLINIC | Age: 55
End: 2025-07-07

## 2025-07-09 ENCOUNTER — OFFICE VISIT (OUTPATIENT)
Dept: ONCOLOGY | Age: 55
End: 2025-07-09
Payer: MEDICARE

## 2025-07-09 ENCOUNTER — HOSPITAL ENCOUNTER (OUTPATIENT)
Dept: LAB | Age: 55
Discharge: HOME | End: 2025-07-09
Payer: MEDICARE

## 2025-07-09 VITALS
HEART RATE: 79 BPM | DIASTOLIC BLOOD PRESSURE: 94 MMHG | SYSTOLIC BLOOD PRESSURE: 132 MMHG | BODY MASS INDEX: 40.32 KG/M2 | WEIGHT: 288 LBS | RESPIRATION RATE: 20 BRPM | TEMPERATURE: 97.9 F | HEIGHT: 71 IN | OXYGEN SATURATION: 98 %

## 2025-07-09 DIAGNOSIS — Z86.718 HISTORY OF DEEP VEIN THROMBOSIS OF LOWER EXTREMITY: ICD-10-CM

## 2025-07-09 DIAGNOSIS — D50.0 IRON DEFICIENCY ANEMIA DUE TO CHRONIC BLOOD LOSS: ICD-10-CM

## 2025-07-09 DIAGNOSIS — D50.0 IRON DEFICIENCY ANEMIA DUE TO CHRONIC BLOOD LOSS: Primary | ICD-10-CM

## 2025-07-09 LAB
ALBUMIN SERPL-MCNC: 3.5 G/DL (ref 3.5–5)
ALBUMIN/GLOB SERPL: 0.8 (ref 1–1.9)
ALP SERPL-CCNC: 100 U/L (ref 35–104)
ALT SERPL-CCNC: 10 U/L (ref 8–45)
ANION GAP SERPL CALC-SCNC: 11 MMOL/L (ref 7–16)
AST SERPL-CCNC: 15 U/L (ref 15–37)
BASOPHILS # BLD: 0.03 K/UL (ref 0–0.2)
BASOPHILS NFR BLD: 0.4 % (ref 0–2)
BILIRUB SERPL-MCNC: 0.4 MG/DL (ref 0–1.2)
BUN SERPL-MCNC: 8 MG/DL (ref 6–23)
CALCIUM SERPL-MCNC: 9.3 MG/DL (ref 8.8–10.2)
CHLORIDE SERPL-SCNC: 104 MMOL/L (ref 98–107)
CO2 SERPL-SCNC: 22 MMOL/L (ref 20–29)
CREAT SERPL-MCNC: 0.8 MG/DL (ref 0.6–1.1)
DIFFERENTIAL METHOD BLD: NORMAL
EOSINOPHIL # BLD: 0.15 K/UL (ref 0–0.8)
EOSINOPHIL NFR BLD: 1.8 % (ref 0.5–7.8)
ERYTHROCYTE [DISTWIDTH] IN BLOOD BY AUTOMATED COUNT: 13.9 % (ref 11.9–14.6)
FERRITIN SERPL-MCNC: 21 NG/ML (ref 8–388)
GLOBULIN SER CALC-MCNC: 4.5 G/DL (ref 2.3–3.5)
GLUCOSE SERPL-MCNC: 104 MG/DL (ref 70–99)
HCT VFR BLD AUTO: 41.3 % (ref 35.8–46.3)
HGB BLD-MCNC: 13.3 G/DL (ref 11.7–15.4)
IMM GRANULOCYTES # BLD AUTO: 0.01 K/UL (ref 0–0.5)
IMM GRANULOCYTES NFR BLD AUTO: 0.1 % (ref 0–5)
IRON SATN MFR SERPL: 20 % (ref 20–50)
IRON SERPL-MCNC: 60 UG/DL (ref 35–100)
LYMPHOCYTES # BLD: 1.44 K/UL (ref 0.5–4.6)
LYMPHOCYTES NFR BLD: 17.5 % (ref 13–44)
MCH RBC QN AUTO: 27.7 PG (ref 26.1–32.9)
MCHC RBC AUTO-ENTMCNC: 32.2 G/DL (ref 31.4–35)
MCV RBC AUTO: 85.9 FL (ref 82–102)
MONOCYTES # BLD: 0.68 K/UL (ref 0.1–1.3)
MONOCYTES NFR BLD: 8.3 % (ref 4–12)
NEUTS SEG # BLD: 5.91 K/UL (ref 1.7–8.2)
NEUTS SEG NFR BLD: 71.9 % (ref 43–78)
NRBC # BLD: 0 K/UL (ref 0–0.2)
PLATELET # BLD AUTO: 239 K/UL (ref 150–450)
PMV BLD AUTO: 11.2 FL (ref 9.4–12.3)
POTASSIUM SERPL-SCNC: 3.9 MMOL/L (ref 3.5–5.1)
PROT SERPL-MCNC: 7.9 G/DL (ref 6.3–8.2)
RBC # BLD AUTO: 4.81 M/UL (ref 4.05–5.2)
SODIUM SERPL-SCNC: 137 MMOL/L (ref 136–145)
TIBC SERPL-MCNC: 304 UG/DL (ref 240–450)
UIBC SERPL-MCNC: 244 UG/DL (ref 112–347)
WBC # BLD AUTO: 8.2 K/UL (ref 4.3–11.1)

## 2025-07-09 PROCEDURE — 85025 COMPLETE CBC W/AUTO DIFF WBC: CPT

## 2025-07-09 PROCEDURE — 83550 IRON BINDING TEST: CPT

## 2025-07-09 PROCEDURE — 83540 ASSAY OF IRON: CPT

## 2025-07-09 PROCEDURE — 80053 COMPREHEN METABOLIC PANEL: CPT

## 2025-07-09 PROCEDURE — 36415 COLL VENOUS BLD VENIPUNCTURE: CPT

## 2025-07-09 PROCEDURE — 82728 ASSAY OF FERRITIN: CPT

## 2025-07-09 PROCEDURE — 99214 OFFICE O/P EST MOD 30 MIN: CPT | Performed by: INTERNAL MEDICINE

## 2025-07-09 NOTE — PROGRESS NOTES
Negative.   Endo/Heme/Allergies: Negative.   Psychiatric/Behavioral: Negative.   All other systems reviewed and are negative.       Allergies   Allergen Reactions    Decadron [Dexamethasone] Anaphylaxis     Unclear if anaphylaxis related to decadron administration    Insect Extract Anaphylaxis     Ants     Tetracycline Anaphylaxis     Patient said when she was little she stopped breathing after taking     Tetracyclines & Related Anaphylaxis     Past Medical History:   Diagnosis Date    DVT (deep venous thrombosis) (HCC)     left leg    Epilepsy (HCC)     Heart attack (HCC) 2018    History of brain surgery      Past Surgical History:   Procedure Laterality Date    ORTHOPEDIC SURGERY Left     wrist/elbow    OTHER SURGICAL HISTORY  2013    Brain - for seizures (Epilepsy)     Family History   Problem Relation Age of Onset    High Blood Pressure Mother     High Blood Pressure Father     Breast Cancer Neg Hx     Cervical Cancer Neg Hx      Social History     Socioeconomic History    Marital status: Single     Spouse name: Not on file    Number of children: Not on file    Years of education: Not on file    Highest education level: Not on file   Occupational History    Not on file   Tobacco Use    Smoking status: Never     Passive exposure: Past    Smokeless tobacco: Never   Vaping Use    Vaping status: Never Used   Substance and Sexual Activity    Alcohol use: Never    Drug use: Never    Sexual activity: Not Currently     Birth control/protection: None   Other Topics Concern    Not on file   Social History Narrative    ** Merged History Encounter **          Social Drivers of Health     Financial Resource Strain: Patient Declined (5/13/2024)    Overall Financial Resource Strain (CARDIA)     Difficulty of Paying Living Expenses: Patient declined   Food Insecurity: Patient Declined (5/12/2025)    Hunger Vital Sign     Worried About Running Out of Food in the Last Year: Patient declined     Ran Out of Food in the Last Year:

## 2025-07-09 NOTE — PATIENT INSTRUCTIONS
Patient Information from Today's Visit    Diagnosis: BRIE      Follow Up Instructions: no follow up needed, let us know if you need anything!      Treatment Summary has been discussed and given to patient: N/A      Current Labs:   Hospital Outpatient Visit on 07/09/2025   Component Date Value Ref Range Status    WBC 07/09/2025 8.2  4.3 - 11.1 K/uL Final    RBC 07/09/2025 4.81  4.05 - 5.2 M/uL Final    Hemoglobin 07/09/2025 13.3  11.7 - 15.4 g/dL Final    Hematocrit 07/09/2025 41.3  35.8 - 46.3 % Final    MCV 07/09/2025 85.9  82.0 - 102.0 FL Final    MCH 07/09/2025 27.7  26.1 - 32.9 PG Final    MCHC 07/09/2025 32.2  31.4 - 35.0 g/dL Final    RDW 07/09/2025 13.9  11.9 - 14.6 % Final    Platelets 07/09/2025 239  150 - 450 K/uL Final    MPV 07/09/2025 11.2  9.4 - 12.3 FL Final    nRBC 07/09/2025 0.00  0.0 - 0.2 K/uL Final    **Note: Absolute NRBC parameter is now reported with Hemogram**    Neutrophils % 07/09/2025 71.9  43.0 - 78.0 % Final    Lymphocytes % 07/09/2025 17.5  13.0 - 44.0 % Final    Monocytes % 07/09/2025 8.3  4.0 - 12.0 % Final    Eosinophils % 07/09/2025 1.8  0.5 - 7.8 % Final    Basophils % 07/09/2025 0.4  0.0 - 2.0 % Final    Immature Granulocytes % 07/09/2025 0.1  0.0 - 5.0 % Final    Neutrophils Absolute 07/09/2025 5.91  1.70 - 8.20 K/UL Final    Lymphocytes Absolute 07/09/2025 1.44  0.50 - 4.60 K/UL Final    Monocytes Absolute 07/09/2025 0.68  0.10 - 1.30 K/UL Final    Eosinophils Absolute 07/09/2025 0.15  0.00 - 0.80 K/UL Final    Basophils Absolute 07/09/2025 0.03  0.00 - 0.20 K/UL Final    Immature Granulocytes Absolute 07/09/2025 0.01  0.00 - 0.50 K/UL Final    Differential Type 07/09/2025 AUTOMATED    Final    Sodium 07/09/2025 137  136 - 145 mmol/L Final    Potassium 07/09/2025 3.9  3.5 - 5.1 mmol/L Final    Chloride 07/09/2025 104  98 - 107 mmol/L Final    CO2 07/09/2025 22  20 - 29 mmol/L Final    Anion Gap 07/09/2025 11  7 - 16 mmol/L Final    Glucose 07/09/2025 104 (H)  70 - 99 mg/dL Final